# Patient Record
Sex: MALE | Race: WHITE | NOT HISPANIC OR LATINO | Employment: UNEMPLOYED | URBAN - NONMETROPOLITAN AREA
[De-identification: names, ages, dates, MRNs, and addresses within clinical notes are randomized per-mention and may not be internally consistent; named-entity substitution may affect disease eponyms.]

---

## 2023-05-07 ENCOUNTER — HOSPITAL ENCOUNTER (EMERGENCY)
Facility: HOSPITAL | Age: 28
Discharge: HOME | End: 2023-05-07
Attending: EMERGENCY MEDICINE

## 2023-05-07 VITALS
SYSTOLIC BLOOD PRESSURE: 152 MMHG | OXYGEN SATURATION: 96 % | HEART RATE: 65 BPM | RESPIRATION RATE: 20 BRPM | TEMPERATURE: 98.1 F | DIASTOLIC BLOOD PRESSURE: 84 MMHG

## 2023-05-07 DIAGNOSIS — L24.9 IRRITANT DERMATITIS: Primary | ICD-10-CM

## 2023-05-07 RX ORDER — DIAPER,BRIEF,INFANT-TODD,DISP
EACH MISCELLANEOUS ONCE
Status: DISCONTINUED | OUTPATIENT
Start: 2023-05-07 | End: 2023-05-07 | Stop reason: HOSPADM

## 2023-05-07 RX ORDER — DEXAMETHASONE 4 MG/1
10 TABLET ORAL ONCE
Status: COMPLETED | OUTPATIENT
Start: 2023-05-07 | End: 2023-05-07

## 2023-05-07 RX ORDER — DIAPER,BRIEF,INFANT-TODD,DISP
EACH MISCELLANEOUS 2 TIMES DAILY
Qty: 30 G | Refills: 0 | Status: SHIPPED | OUTPATIENT
Start: 2023-05-07 | End: 2023-05-12

## 2023-05-07 RX ADMIN — DEXAMETHASONE 10 MG: 4 TABLET ORAL at 20:40

## 2023-05-08 NOTE — ED PROVIDER NOTES
History  Chief Complaint   Patient presents with   • Rash     Patient states he fell into some bushes and noticed a rash that burns on both of his arms  This is a 29-year-old male who presents to the emergency department because of a rash developing on his right ventral wrist and left lateral forearm after he fell into a pile of brush about 2 hours prior to arrival   He states that the rash developed within seconds of contacting the plant material   It did blister in the area of the right wrist and left forearm, but has markedly decreased since that point  The areas now have a mild burning sensation  He notes that the area from which the brush was drawn does contain poison ivy, but also contains a distinct plant that has small irritant leaves that seem to cause a reaction when they touch the skin  He is not sure exactly what plants may have been in the brush into which he fell  He did wash the areas off vigorously with water but did not apply any other topical agents to the area  At no point did he have any facial swelling/tongue swelling/dyspnea/generalized urticaria  Very minimal reaction present at this point  There was never any evidence of a generalized anaphylactic reaction  A topical steroid medication would make the most sense in this case  Unfortunately, hydrocortisone was not available in the emergency department  Patient was given an enteral dose of dexamethasone which should provide fairly long duration effects and a prescription for hydrocortisone which can use for the next 5 days or so  Would not expect there to be persistent or prolonged rash given that it has already markedly improved within the space of 90 minutes  If it were to be prolonged, he should have the area reexamined  Otherwise all questions were answered to his satisfaction prior to discharge  He expressed understanding and agreed to plan        History provided by:  Significant other, patient and relative  Rash      None       No past medical history on file  No past surgical history on file  No family history on file  I have reviewed and agree with the history as documented  No existing history information found  No existing history information found  Review of Systems   Constitutional: Negative  Musculoskeletal: Negative  Skin: Positive for rash  Negative for color change, pallor and wound  Hematological: Negative  Physical Exam  Physical Exam  Vitals and nursing note reviewed  Constitutional:       General: He is awake  He is not in acute distress  Appearance: Normal appearance  He is well-developed and well-groomed  He is not ill-appearing  HENT:      Head: Normocephalic and atraumatic  Right Ear: Hearing and external ear normal       Left Ear: Hearing and external ear normal    Neck:      Trachea: Trachea and phonation normal    Cardiovascular:      Rate and Rhythm: Normal rate and regular rhythm  Pulses: Normal pulses  Radial pulses are 2+ on the right side and 2+ on the left side  Pulmonary:      Effort: No tachypnea, accessory muscle usage or respiratory distress  Skin:     General: Skin is warm and dry  Capillary Refill: Capillary refill takes less than 2 seconds  Comments: Right ventrolateral wrist: Well demarcated erythematous papular rash approximately 2 cm X 3 cm: There are no vesicles  No urticaria  No open areas  Left lateral forearm: well-demarcated erythematous papular rash approximately 4 cm X 5 cm with no vesicles or urticaria  Neurological:      Mental Status: He is alert and oriented to person, place, and time  GCS: GCS eye subscore is 4  GCS verbal subscore is 5  GCS motor subscore is 6           Vital Signs  ED Triage Vitals [05/07/23 1944]   Temperature Pulse Respirations Blood Pressure SpO2   98 1 °F (36 7 °C) 65 20 152/84 96 %      Temp src Heart Rate Source Patient Position - Orthostatic VS BP Location FiO2 (%)   -- -- -- -- --      Pain Score       --           Vitals:    05/07/23 1944   BP: 152/84   Pulse: 65         Visual Acuity      ED Medications  Medications   hydrocortisone 1 % cream ( Topical Not Given 5/7/23 2039)   dexamethasone (DECADRON) tablet 10 mg (10 mg Oral Given 5/7/23 2040)       Diagnostic Studies  Results Reviewed     None                 No orders to display              Procedures  Procedures         ED Course         MDM    Disposition  Final diagnoses:   Irritant dermatitis of right wrist and left forearm     Time reflects when diagnosis was documented in both MDM as applicable and the Disposition within this note     Time User Action Codes Description Comment    5/7/2023  8:07 PM Erma Arroyo Add [L24 9] Irritant dermatitis     5/7/2023  8:07 PM Anam Jensen 1521 [L24 9] Irritant dermatitis of right wrist and left forearm       ED Disposition     ED Disposition   Discharge    Condition   Stable    Date/Time   Sun May 7, 2023  8:07 PM    Comment   Ronnie Kurtz discharge to home/self care  Follow-up Information    None         Discharge Medication List as of 5/7/2023  8:10 PM      START taking these medications    Details   hydrocortisone 1 % ointment Apply topically 2 (two) times a day for 5 days, Starting Sun 5/7/2023, Until Fri 5/12/2023, Print             No discharge procedures on file      PDMP Review     None          ED Provider  Electronically Signed by           Lavell Persaud DO  05/07/23 2884

## 2023-05-08 NOTE — DISCHARGE INSTRUCTIONS
You can apply the topical steroid twice daily for the next 5 days  Similar topical steroids may be available more cheaply over-the-counter  A pharmacist can assist you with this  If the areas are getting worse despite use of the topical steroid, you should be looked at again

## 2023-10-28 ENCOUNTER — HOSPITAL ENCOUNTER (EMERGENCY)
Facility: HOSPITAL | Age: 28
Discharge: HOME | End: 2023-10-28
Attending: EMERGENCY MEDICINE

## 2023-10-28 VITALS
RESPIRATION RATE: 18 BRPM | WEIGHT: 165 LBS | HEIGHT: 68 IN | BODY MASS INDEX: 25.01 KG/M2 | TEMPERATURE: 97 F | SYSTOLIC BLOOD PRESSURE: 127 MMHG | DIASTOLIC BLOOD PRESSURE: 68 MMHG | HEART RATE: 63 BPM | OXYGEN SATURATION: 96 %

## 2023-10-28 DIAGNOSIS — R11.2 NAUSEA AND VOMITING: Primary | ICD-10-CM

## 2023-10-28 PROCEDURE — 99282 EMERGENCY DEPT VISIT SF MDM: CPT

## 2023-10-28 PROCEDURE — 99283 EMERGENCY DEPT VISIT LOW MDM: CPT | Performed by: EMERGENCY MEDICINE

## 2023-10-28 NOTE — Clinical Note
Amisha Almanzar was seen and treated in our emergency department on 10/28/2023.    ?    ? ? Diagnosis: nause and vomiting    Pratik Montalvo  may return to work on return date. He may return on this date: 10/29/2023    ? If you have any questions or concerns, please don't hesitate to call.       Jovita Walsh, DO    ______________________________           _______________          _______________  Hospital Representative                              Date                                Time

## 2023-10-29 NOTE — ED PROVIDER NOTES
History  Chief Complaint   Patient presents with    Vomiting     Pt vomited earlier today and called off work, He needs a note to return to work     Armand Duverney is a 25y.o. year old male without PCP presenting to the [facility] ED for evaluation after two episodes of vomiting. Patient states he was in normal state of health prior to going to bed last night. Earlier this morning patient noted nausea and had 2 episodes of vomiting. He had no associated fever, congestion, chest pain, dyspnea or abdominal pain. No reported diarrhea. No recent sick contacts or travel. No suspicious food intake. Patient states he had to call off of work due to his symptoms however has been feeling fine throughout the day today. He was told by his employer he needed a note stating he can return to work. He has had resolution of nausea and vomiting. He is tolerating oral intake including drinking a [employer] energy drink shortly prior to arrival this evening. At time evaluation in the emergency department the patient denies abdominal pain, nausea or diarrhea. Patient has not taken/received any medications at home for relief of symptoms. History provided by:  Medical records and patient   used: No    Vomiting  Associated symptoms: no abdominal pain, no arthralgias, no chills, no diarrhea and no fever        Prior to Admission Medications   Prescriptions Last Dose Informant Patient Reported? Taking?   hydrocortisone 1 % ointment   No No   Sig: Apply topically 2 (two) times a day for 5 days      Facility-Administered Medications: None       History reviewed. No pertinent past medical history. History reviewed. No pertinent surgical history. History reviewed. No pertinent family history. I have reviewed and agree with the history as documented.     E-Cigarette/Vaping    E-Cigarette Use Current Every Day User      E-Cigarette/Vaping Substances     Social History     Tobacco Use    Smoking status: Every Day Types: Cigarettes    Smokeless tobacco: Never   Vaping Use    Vaping Use: Every day   Substance Use Topics    Alcohol use: Yes    Drug use: Yes     Types: Marijuana       Review of Systems   Constitutional:  Negative for chills and fever. HENT:  Negative for congestion and rhinorrhea. Respiratory:  Negative for shortness of breath. Cardiovascular:  Negative for chest pain. Gastrointestinal:  Positive for nausea and vomiting. Negative for abdominal pain and diarrhea. Genitourinary:  Negative for dysuria, flank pain, penile pain and testicular pain. Musculoskeletal:  Negative for arthralgias. Skin:  Negative for rash. All other systems reviewed and are negative. Physical Exam  Physical Exam  Vitals and nursing note reviewed. Constitutional:       General: He is not in acute distress. Appearance: He is well-developed. He is not ill-appearing, toxic-appearing or diaphoretic. HENT:      Head: Normocephalic and atraumatic. Nose: No congestion or rhinorrhea. Eyes:      General:         Right eye: No discharge. Left eye: No discharge. Cardiovascular:      Rate and Rhythm: Normal rate and regular rhythm. Pulmonary:      Effort: Pulmonary effort is normal. No respiratory distress. Breath sounds: Normal breath sounds. No wheezing or rales. Abdominal:      Palpations: Abdomen is soft. Tenderness: There is no abdominal tenderness. There is no right CVA tenderness, left CVA tenderness, guarding or rebound. Musculoskeletal:      Cervical back: Normal range of motion. No rigidity. Skin:     General: Skin is warm. Capillary Refill: Capillary refill takes less than 2 seconds. Neurological:      Mental Status: He is alert and oriented to person, place, and time.    Psychiatric:         Mood and Affect: Mood normal.         Behavior: Behavior normal.         Vital Signs  ED Triage Vitals [10/28/23 2257]   Temperature Pulse Respirations Blood Pressure SpO2   (!) 97 °F (36.1 °C) 63 18 127/68 96 %      Temp Source Heart Rate Source Patient Position - Orthostatic VS BP Location FiO2 (%)   Tympanic Monitor Sitting Left arm --      Pain Score       No Pain           Vitals:    10/28/23 2257   BP: 127/68   Pulse: 63   Patient Position - Orthostatic VS: Sitting         Visual Acuity      ED Medications  Medications - No data to display    Diagnostic Studies  Results Reviewed       None                   No orders to display              Procedures  Procedures         ED Course                               SBIRT 20yo+      Flowsheet Row Most Recent Value   Initial Alcohol Screen: US AUDIT-C     1. How often do you have a drink containing alcohol? 0 Filed at: 10/28/2023 2301   2. How many drinks containing alcohol do you have on a typical day you are drinking? 0 Filed at: 10/28/2023 2301   3a. Male UNDER 65: How often do you have five or more drinks on one occasion? 0 Filed at: 10/28/2023 2301   Audit-C Score 0 Filed at: 10/28/2023 2301   HEIDI: How many times in the past year have you. .. Used an illegal drug or used a prescription medication for non-medical reasons? Never Filed at: 10/28/2023 2301                      Medical Decision Making    25 y.o. male presenting for evaluation after two episodes of N/V. Symptoms since resolved, tolerating PO intake including a monster energy drink PTA.  VSS, no abdominal tenderness on exam.  I considered and do not suspect DM, cholecystitis, pancreatitis, appendicitis, bowel obstruction, diverticulitis,  kidney stone or testicular torsion. Symptoms more likely consistent with transient gastritis. I have discussed with the patient our plan to discharge them from the ED and the patient is in agreement with this plan. The patient was provided a written after visit summary with strict RTED precautions. Patient provided note to return to work tomorrow if he remains asymptomatic.     Followup: I have discussed with the patient plan to follow up with a PCP. Contact information provided in AVS.                 Disposition  Final diagnoses:   Nausea and vomiting     Time reflects when diagnosis was documented in both MDM as applicable and the Disposition within this note       Time User Action Codes Description Comment    10/28/2023 11:07 PM Tino Carolina Add [R11.2] Nausea and vomiting           ED Disposition       ED Disposition   Discharge    Condition   Stable    Date/Time   Sat Oct 28, 2023 2300 [address] discharge to home/self care. Follow-up Information       Follow up With Specialties Details Why Contact Info Additional [facility] Schedule an appointment as soon as possible for a visit  To establish care. [address] [place] [phone]  [address] [facility], [address] [place] [address], [address]            Discharge Medication List as of 10/28/2023 11:08 PM        CONTINUE these medications which have NOT CHANGED    Details   hydrocortisone 1 % ointment Apply topically 2 (two) times a day for 5 days, Starting Sun 5/7/2023, Until Fri 5/12/2023, Print             No discharge procedures on file.     PDMP Review       None            ED Provider  Electronically Signed by             Debi Whitman DO  10/28/23 7899

## 2023-10-29 NOTE — DISCHARGE INSTRUCTIONS
You have been seen for nausea and vomiting. Return to the emergency department if you develop worsening vomiting, abdominal pain, fevers or any other symptoms of concern. Please follow up with a PCP by calling the number provided.

## 2024-04-30 ENCOUNTER — HOSPITAL ENCOUNTER (EMERGENCY)
Facility: HOSPITAL | Age: 29
Discharge: HOME | End: 2024-04-30
Attending: EMERGENCY MEDICINE | Admitting: EMERGENCY MEDICINE

## 2024-04-30 VITALS
RESPIRATION RATE: 18 BRPM | WEIGHT: 164.9 LBS | TEMPERATURE: 98.5 F | DIASTOLIC BLOOD PRESSURE: 65 MMHG | OXYGEN SATURATION: 93 % | HEART RATE: 61 BPM | BODY MASS INDEX: 25.07 KG/M2 | SYSTOLIC BLOOD PRESSURE: 113 MMHG

## 2024-04-30 DIAGNOSIS — K62.5 RECTAL BLEEDING: Primary | ICD-10-CM

## 2024-04-30 DIAGNOSIS — E87.6 HYPOKALEMIA: ICD-10-CM

## 2024-04-30 LAB
APTT PPP: 31 SECONDS (ref 23–37)
BASOPHILS # BLD AUTO: 0.03 THOUSANDS/ÂΜL (ref 0–0.1)
BASOPHILS NFR BLD AUTO: 0 % (ref 0–1)
EOSINOPHIL # BLD AUTO: 0.3 THOUSAND/ÂΜL (ref 0–0.61)
EOSINOPHIL NFR BLD AUTO: 4 % (ref 0–6)
ERYTHROCYTE [DISTWIDTH] IN BLOOD BY AUTOMATED COUNT: 12.7 % (ref 11.6–15.1)
HCT VFR BLD AUTO: 32.3 % (ref 36.5–49.3)
HGB BLD-MCNC: 10.3 G/DL (ref 12–17)
IMM GRANULOCYTES # BLD AUTO: 0.01 THOUSAND/UL (ref 0–0.2)
IMM GRANULOCYTES NFR BLD AUTO: 0 % (ref 0–2)
INR PPP: 1.12 (ref 0.84–1.19)
LYMPHOCYTES # BLD AUTO: 2.27 THOUSANDS/ÂΜL (ref 0.6–4.47)
LYMPHOCYTES NFR BLD AUTO: 29 % (ref 14–44)
MAGNESIUM SERPL-MCNC: 2 MG/DL (ref 1.9–2.7)
MCH RBC QN AUTO: 26.5 PG (ref 26.8–34.3)
MCHC RBC AUTO-ENTMCNC: 31.9 G/DL (ref 31.4–37.4)
MCV RBC AUTO: 83 FL (ref 82–98)
MONOCYTES # BLD AUTO: 0.69 THOUSAND/ÂΜL (ref 0.17–1.22)
MONOCYTES NFR BLD AUTO: 9 % (ref 4–12)
NEUTROPHILS # BLD AUTO: 4.43 THOUSANDS/ÂΜL (ref 1.85–7.62)
NEUTS SEG NFR BLD AUTO: 58 % (ref 43–75)
NRBC BLD AUTO-RTO: 0 /100 WBCS
PHOSPHATE SERPL-MCNC: 2.9 MG/DL (ref 2.7–4.5)
PLATELET # BLD AUTO: 249 THOUSANDS/UL (ref 149–390)
PMV BLD AUTO: 10.4 FL (ref 8.9–12.7)
PROTHROMBIN TIME: 14.3 SECONDS (ref 11.6–14.5)
RBC # BLD AUTO: 3.88 MILLION/UL (ref 3.88–5.62)
WBC # BLD AUTO: 7.73 THOUSAND/UL (ref 4.31–10.16)

## 2024-04-30 PROCEDURE — 83735 ASSAY OF MAGNESIUM: CPT | Performed by: EMERGENCY MEDICINE

## 2024-04-30 PROCEDURE — 99284 EMERGENCY DEPT VISIT MOD MDM: CPT | Performed by: EMERGENCY MEDICINE

## 2024-04-30 PROCEDURE — 85610 PROTHROMBIN TIME: CPT | Performed by: EMERGENCY MEDICINE

## 2024-04-30 PROCEDURE — 84100 ASSAY OF PHOSPHORUS: CPT | Performed by: EMERGENCY MEDICINE

## 2024-04-30 PROCEDURE — 85025 COMPLETE CBC W/AUTO DIFF WBC: CPT | Performed by: EMERGENCY MEDICINE

## 2024-04-30 PROCEDURE — 36415 COLL VENOUS BLD VENIPUNCTURE: CPT | Performed by: EMERGENCY MEDICINE

## 2024-04-30 PROCEDURE — 80053 COMPREHEN METABOLIC PANEL: CPT | Performed by: EMERGENCY MEDICINE

## 2024-04-30 PROCEDURE — 85730 THROMBOPLASTIN TIME PARTIAL: CPT | Performed by: EMERGENCY MEDICINE

## 2024-04-30 PROCEDURE — 99284 EMERGENCY DEPT VISIT MOD MDM: CPT

## 2024-04-30 RX ORDER — POTASSIUM CHLORIDE 20MEQ/15ML
40 LIQUID (ML) ORAL ONCE
Status: DISCONTINUED | OUTPATIENT
Start: 2024-04-30 | End: 2024-04-30 | Stop reason: HOSPADM

## 2024-04-30 RX ORDER — MAGNESIUM SULFATE HEPTAHYDRATE 40 MG/ML
2 INJECTION, SOLUTION INTRAVENOUS ONCE
Status: DISCONTINUED | OUTPATIENT
Start: 2024-04-30 | End: 2024-04-30

## 2024-04-30 RX ORDER — LANOLIN ALCOHOL/MO/W.PET/CERES
400 CREAM (GRAM) TOPICAL ONCE
Status: DISCONTINUED | OUTPATIENT
Start: 2024-04-30 | End: 2024-04-30 | Stop reason: HOSPADM

## 2024-04-30 NOTE — DISCHARGE INSTRUCTIONS
Please go to gastroenterology office tomorrow on May 1st at 1130 AM:    [address] [facility]   [id number]   [phone]

## 2024-04-30 NOTE — ED PROVIDER NOTES
History  Chief Complaint   Patient presents with    Rectal Bleeding     Bloating and rectal bleeding with bowel movement for past 3 months     28-year-old male presents for evaluation of rectal bleeding and intermittent bloating of the abdomen.  Patient states symptoms started approximately 3 months ago distally.  No report of a family history of bowel abnormalities.  Patient denies any recent international or local travel.  States does vape and smoke marijuana.  No other obvious novel ingestions.  Patient denies any fever or chills.  No abdominal pain.  He only describes painless bright red blood per rectum.  No hemorrhoids.  No unintentional weight loss or constitutional symptoms.  Patient has been tolerating p.o. intake without difficulty.  Patient is otherwise asymptomatic.  No rectal trauma or penetration per patient.  On exam, he is overall very well-appearing and not in acute distress.          Prior to Admission Medications   Prescriptions Last Dose Informant Patient Reported? Taking?   hydrocortisone 1 % ointment   No No   Sig: Apply topically 2 (two) times a day for 5 days      Facility-Administered Medications: None       History reviewed. No pertinent past medical history.    History reviewed. No pertinent surgical history.    History reviewed. No pertinent family history.  I have reviewed and agree with the history as documented.    E-Cigarette/Vaping    E-Cigarette Use Current Every Day User      E-Cigarette/Vaping Substances     Social History     Tobacco Use    Smoking status: Every Day     Types: Cigarettes    Smokeless tobacco: Never   Vaping Use    Vaping status: Every Day   Substance Use Topics    Alcohol use: Yes    Drug use: Yes     Types: Marijuana       Review of Systems   Constitutional:  Negative for activity change, appetite change, chills, diaphoresis, fatigue and fever.   HENT:  Negative for dental problem, ear pain, sore throat, trouble swallowing and voice change.    Eyes:  Negative  for pain and visual disturbance.   Respiratory:  Negative for cough, chest tightness, shortness of breath and wheezing.    Cardiovascular:  Negative for chest pain, palpitations and leg swelling.   Gastrointestinal:  Positive for abdominal distention and blood in stool. Negative for abdominal pain, anal bleeding, diarrhea, nausea, rectal pain and vomiting.   Endocrine: Negative for polydipsia, polyphagia and polyuria.   Genitourinary:  Negative for difficulty urinating, dysuria, flank pain, frequency, hematuria and urgency.   Musculoskeletal:  Negative for back pain, joint swelling, myalgias, neck pain and neck stiffness.   Skin:  Negative for pallor, rash and wound.   Neurological:  Negative for dizziness, facial asymmetry, speech difficulty, weakness, light-headedness, numbness and headaches.   Hematological:  Negative for adenopathy.   Psychiatric/Behavioral:  Negative for agitation. The patient is not nervous/anxious.    All other systems reviewed and are negative.      Physical Exam  Physical Exam  Vitals and nursing note reviewed.   Constitutional:       General: He is not in acute distress.     Appearance: He is well-developed. He is not ill-appearing, toxic-appearing or diaphoretic.   HENT:      Head: Normocephalic and atraumatic.      Right Ear: External ear normal.      Left Ear: External ear normal.      Nose: Nose normal. No congestion or rhinorrhea.      Mouth/Throat:      Mouth: Mucous membranes are moist.   Eyes:      General: No visual field deficit or scleral icterus.        Right eye: No discharge.         Left eye: No discharge.      Extraocular Movements: Extraocular movements intact.      Conjunctiva/sclera: Conjunctivae normal.      Pupils: Pupils are equal, round, and reactive to light.   Neck:      Thyroid: No thyromegaly.      Vascular: No JVD.      Trachea: No tracheal deviation.   Cardiovascular:      Rate and Rhythm: Normal rate and regular rhythm.      Pulses: Normal pulses.      Heart  sounds: Normal heart sounds, S1 normal and S2 normal. No murmur heard.     No friction rub. No gallop.   Pulmonary:      Effort: Pulmonary effort is normal. No accessory muscle usage, respiratory distress or retractions.      Breath sounds: Normal breath sounds and air entry. No stridor or decreased air movement. No decreased breath sounds, wheezing, rhonchi or rales.   Chest:      Chest wall: No tenderness.   Abdominal:      General: Bowel sounds are normal. There is no distension.      Palpations: Abdomen is soft. There is no mass.      Tenderness: There is no abdominal tenderness. There is no right CVA tenderness, left CVA tenderness, guarding or rebound. Negative signs include De La Cruz's sign and McBurney's sign.      Hernia: No hernia is present.   Genitourinary:     Prostate: Normal. Not enlarged, not tender and no nodules present.      Rectum: Guaiac result positive. No mass, tenderness, anal fissure, external hemorrhoid or internal hemorrhoid. Normal anal tone.      Comments: Chaperone present for the exam.  No active bleeding.  No melena or hematochezia.  Musculoskeletal:         General: No swelling, tenderness or deformity. Normal range of motion.      Cervical back: Normal range of motion and neck supple.      Right lower leg: No edema.      Left lower leg: No edema.   Lymphadenopathy:      Cervical: No cervical adenopathy.   Skin:     General: Skin is warm and dry.      Capillary Refill: Capillary refill takes less than 2 seconds.      Coloration: Skin is not pale.      Findings: No erythema or rash.   Neurological:      General: No focal deficit present.      Mental Status: He is alert and oriented to person, place, and time.      GCS: GCS eye subscore is 4. GCS verbal subscore is 5. GCS motor subscore is 6.      Cranial Nerves: Cranial nerves 2-12 are intact. No cranial nerve deficit, dysarthria or facial asymmetry.      Sensory: Sensation is intact. No sensory deficit.      Motor: Motor function is  intact. No weakness, tremor, atrophy, abnormal muscle tone or seizure activity.      Coordination: Coordination is intact. Coordination normal.      Gait: Gait is intact.      Deep Tendon Reflexes: Reflexes are normal and symmetric. Reflexes normal.   Psychiatric:         Behavior: Behavior normal.           Vital Signs  ED Triage Vitals [04/30/24 1438]   Temperature Pulse Respirations Blood Pressure SpO2   98.5 °F (36.9 °C) 62 18 123/68 94 %      Temp Source Heart Rate Source Patient Position - Orthostatic VS BP Location FiO2 (%)   Temporal Monitor Sitting Left arm --      Pain Score       No Pain           Vitals:    04/30/24 1438 04/30/24 1625 04/30/24 1629   BP: 123/68  113/65   Pulse: 62 59 61   Patient Position - Orthostatic VS: Sitting           Visual Acuity      ED Medications  Medications - No data to display      Diagnostic Studies  Results Reviewed       Procedure Component Value Units Date/Time    Comprehensive metabolic panel [[id number]]  (Abnormal) Collected: 04/30/24 1559    Lab Status: Final result Specimen: Blood from Arm, Right Updated: 04/30/24 1624     Sodium 137 mmol/L      Potassium 3.4 mmol/L      Chloride 103 mmol/L      CO2 28 mmol/L      ANION GAP 6 mmol/L      BUN 8 mg/dL      Creatinine 0.78 mg/dL      Glucose 87 mg/dL      Calcium 9.5 mg/dL      AST 16 U/L      ALT 10 U/L      Alkaline Phosphatase 32 U/L      Total Protein 6.7 g/dL      Albumin 4.3 g/dL      Total Bilirubin 0.53 mg/dL      eGFR 121 ml/min/1.73sq m     Narrative:      National Kid[employer] Foundation guidelines for Chronic Kidney Disease (CKD):     Stage 1 with normal or high GFR (GFR > 90 mL/min/1.73 square meters)    Stage 2 Mild CKD (GFR = 60-89 mL/min/1.73 square meters)    Stage 3A Moderate CKD (GFR = 45-59 mL/min/1.73 square meters)    Stage 3B Moderate CKD (GFR = 30-44 mL/min/1.73 square meters)    Stage 4 Severe CKD (GFR = 15-29 mL/min/1.73 square meters)    Stage 5 End Stage CKD (GFR <15 mL/min/1.73 square  meters)  Note: GFR calculation is accurate only with a steady state creatinine    Phosphorus [[id number]]  (Normal) Collected: 04/30/24 1559    Lab Status: Final result Specimen: Blood from Arm, Right Updated: 04/30/24 1624     Phosphorus 2.9 mg/dL     Magnesium [[id number]]  (Normal) Collected: 04/30/24 1559    Lab Status: Final result Specimen: Blood from Arm, Right Updated: 04/30/24 1624     Magnesium 2.0 mg/dL     Protime-INR [[id number]]  (Normal) Collected: 04/30/24 1559    Lab Status: Final result Specimen: Blood from Arm, Right Updated: 04/30/24 1619     Protime 14.3 seconds      INR 1.12    APTT [[id number]]  (Normal) Collected: 04/30/24 1559    Lab Status: Final result Specimen: Blood from Arm, Right Updated: 04/30/24 1619     PTT 31 seconds     CBC and differential [[id number]]  (Abnormal) Collected: 04/30/24 1559    Lab Status: Final result Specimen: Blood from Arm, Right Updated: 04/30/24 1608     WBC 7.73 Thousand/uL      RBC 3.88 Million/uL      Hemoglobin 10.3 g/dL      Hematocrit 32.3 %      MCV 83 fL      MCH 26.5 pg      MCHC 31.9 g/dL      RDW 12.7 %      MPV 10.4 fL      Platelets 249 Thousands/uL      nRBC 0 /100 WBCs      Segmented % 58 %      Immature Grans % 0 %      Lymphocytes % 29 %      Monocytes % 9 %      Eosinophils Relative 4 %      Basophils Relative 0 %      Absolute Neutrophils 4.43 Thousands/µL      Absolute Immature Grans 0.01 Thousand/uL      Absolute Lymphocytes 2.27 Thousands/µL      Absolute Monocytes 0.69 Thousand/µL      Eosinophils Absolute 0.30 Thousand/µL      Basophils Absolute 0.03 Thousands/µL                    No orders to display              Procedures  Procedures         ED Course           1625 hrs.: Discussed case with gastroenterology on-call.  Patient will have an outpatient appointment tomorrow at 1130 hrs.  He is to present himself to the call dental office.  Patient is aware.  Will provide electrolyte replacement.                    SBIRT 20yo+      Flowsheet  Row Most Recent Value   Initial Alcohol Screen: US AUDIT-C     1. How often do you have a drink containing alcohol? 0 Filed at: 04/30/2024 1440   2. How many drinks containing alcohol do you have on a typical day you are drinking?  0 Filed at: 04/30/2024 1440   3a. Male UNDER 65: How often do you have five or more drinks on one occasion? 0 Filed at: 04/30/2024 1440   Audit-C Score 0 Filed at: 04/30/2024 1440   HEIDI: How many times in the past year have you...    Used an illegal drug or used a prescription medication for non-medical reasons? Never Filed at: 04/30/2024 1440                      Medical Decision Making  28-year-old male presents for evaluation of rectal bleeding and intermittent bloating of the abdomen.  Patient states symptoms started approximately 3 months ago distally.  No report of a family history of bowel abnormalities.  Patient denies any recent international or local travel.  States does vape and smoke marijuana.  No other obvious novel ingestions.  Patient denies any fever or chills.  No abdominal pain.  He only describes painless bright red blood per rectum.  No hemorrhoids.  No unintentional weight loss or constitutional symptoms.  Patient has been tolerating p.o. intake without difficulty.  Patient is otherwise asymptomatic.  No rectal trauma or penetration per patient.  On exam, he is overall very well-appearing and not in acute distress.    Check basic labs, provide symptom management, imaging as appropriate, and disposition as appropriate.  Will need gastroenterology follow-up.  At this time based on lack of abdominal pain or tenderness on physical exam and no active bleeding, will not obtain CT imaging.  Patient is agreeable with this plan.      Amount and/or Complexity of Data Reviewed  Labs: ordered. Decision-making details documented in ED Course.             Disposition  Final diagnoses:   Rectal bleeding   Hypokalemia     Time reflects when diagnosis was documented in both MDM as  applicable and the Disposition within this note       Time User Action Codes Description Comment    4/30/2024  3:40 PM David Kirkpatrick [K62.5] Rectal bleeding     4/30/2024  4:28 PM David Kirkpatrick [E87.6] Hypokalemia           ED Disposition       ED Disposition   Discharge    Condition   Stable    Date/Time   Tue Apr 30, 2024 1540    Comment   Danny Nova discharge to home/self care.                   Follow-up Information       Follow up With Specialties Details Why Contact Info Additional Information    [facility]  TOMORROW at [address] [facility] [address]  [phone] [facility] [place], [address] [place], [place], [address]   [phone]    [facility]   [address] [address]  [phone] [facility] [phone]            Discharge Medication List as of 4/30/2024  4:35 PM        CONTINUE these medications which have NOT CHANGED    Details   hydrocortisone 1 % ointment Apply topically 2 (two) times a day for 5 days, Starting Sun 5/7/2023, Until Fri 5/12/2023, Print                 PDMP Review       None            ED Provider  Electronically Signed by             David Kirkpatrick DO  05/01/24 7993

## 2024-04-30 NOTE — Clinical Note
Danny Nova was seen and treated in our emergency department on 4/30/2024.                Diagnosis:     Danny  may return to work on return date.    He may return on this date: 05/02/2024         If you have any questions or concerns, please don't hesitate to call.      David Kirkpatrick, DO    ______________________________           _______________          _______________  Hospital Representative                              Date                                Time

## 2024-05-01 ENCOUNTER — TELEPHONE (OUTPATIENT)
Dept: GASTROENTEROLOGY | Facility: CLINIC | Age: 29
End: 2024-05-01

## 2024-05-01 NOTE — TELEPHONE ENCOUNTER
The patient did arrive for his office visit today, however, unfortunately he does not have any medical insurance and is hoping to have some in 2 weeks, and cannot afford to pay cash or out-of-pocket.  We are going to reschedule him for a follow-up visit in 2 weeks with the hopes that he will have some kind of insurance coverage by then and he was also given information for billing department to reach out to see what other possible programs he may qualify for with regards to some kind of assistance in getting some kind of medical coverage.    I did quickly talk to the patient just so I could get some kind of a sense of what his symptoms and how he is feeling and what he should do if things should change or worsen in the future.    The patient appeared to be without any kind of signs or symptoms of distress, SOB, with normal skin tone and mental comprehension skills.  The patient reports that approximately 3 weeks ago he did have some issues with constipation, as he does when he has too much milk products.  He reports that he did have what sounds like a stool bolus that required him to digitally disimpact himself and it appears that around then is when the bleeding started.  I did discuss with the patient that it is quite possible that with the digital disimpaction and the size of the stool bolus he may have caused some type of laceration or injury inside his rectum or anus as this tissue is very sensitive and can tear easily and thus the cause of his bleeding, especially if he continues to deal with constipation as he may be lactose intolerant.    I quickly advised the patient that he should make sure he is drinking enough water to make his bowel movements softer to prevent any further damage and let everything heal as well as a try to increase some daily dietary fiber such as fruits and vegetables or consider a fiber supplementation such as Metamucil or Benefiber daily as well.    We also briefly discussed that if  his bleeding symptoms should worsen, persist, or he should start with symptoms of shortness of breath, chest pain, dizziness, or significant fatigue, he should go back to the ER for reevaluation.  The patient was agreeable and verbalized an understanding.

## 2024-05-08 LAB
ALBUMIN SERPL BCP-MCNC: 4.3 G/DL (ref 3.5–5)
ALP SERPL-CCNC: 32 U/L (ref 34–104)
ALT SERPL W P-5'-P-CCNC: 10 U/L (ref 7–52)
ANION GAP SERPL CALCULATED.3IONS-SCNC: 6 MMOL/L (ref 4–13)
AST SERPL W P-5'-P-CCNC: 16 U/L (ref 13–39)
BILIRUB SERPL-MCNC: 0.53 MG/DL (ref 0.2–1)
BUN SERPL-MCNC: 8 MG/DL (ref 5–25)
CALCIUM SERPL-MCNC: 9.5 MG/DL (ref 8.4–10.2)
CHLORIDE SERPL-SCNC: 103 MMOL/L (ref 96–108)
CO2 SERPL-SCNC: 28 MMOL/L (ref 21–32)
CREAT SERPL-MCNC: 0.78 MG/DL (ref 0.6–1.3)
GFR SERPL CREATININE-BSD FRML MDRD: 121 ML/MIN/1.73SQ M
GLUCOSE SERPL-MCNC: 87 MG/DL (ref 65–140)
POTASSIUM SERPL-SCNC: 3.4 MMOL/L (ref 3.5–5.3)
PROT SERPL-MCNC: 6.7 G/DL (ref 6.4–8.4)
SODIUM SERPL-SCNC: 137 MMOL/L (ref 135–147)

## 2024-06-20 ENCOUNTER — TELEPHONE (OUTPATIENT)
Dept: GASTROENTEROLOGY | Facility: CLINIC | Age: 29
End: 2024-06-20

## 2025-05-22 ENCOUNTER — HOSPITAL ENCOUNTER (INPATIENT)
Facility: HOSPITAL | Age: 30
LOS: 1 days | Discharge: HOME | DRG: 663 | End: 2025-05-23
Attending: EMERGENCY MEDICINE | Admitting: FAMILY MEDICINE
Payer: COMMERCIAL

## 2025-05-22 ENCOUNTER — APPOINTMENT (EMERGENCY)
Dept: CT IMAGING | Facility: HOSPITAL | Age: 30
DRG: 663 | End: 2025-05-22
Payer: COMMERCIAL

## 2025-05-22 DIAGNOSIS — K59.00 CONSTIPATION, UNSPECIFIED CONSTIPATION TYPE: ICD-10-CM

## 2025-05-22 DIAGNOSIS — Z87.19 HISTORY OF RECTAL BLEEDING: ICD-10-CM

## 2025-05-22 DIAGNOSIS — R10.10 PAIN OF UPPER ABDOMEN: ICD-10-CM

## 2025-05-22 DIAGNOSIS — D64.9 SYMPTOMATIC ANEMIA: Primary | ICD-10-CM

## 2025-05-22 DIAGNOSIS — Z13.9 ENCOUNTER FOR SCREENING INVOLVING SOCIAL DETERMINANTS OF HEALTH (SDOH): ICD-10-CM

## 2025-05-22 PROBLEM — K59.09 CHRONIC CONSTIPATION: Status: ACTIVE | Noted: 2025-05-22

## 2025-05-22 LAB
ABO GROUP BLD: NORMAL
ABO GROUP BLD: NORMAL
ALBUMIN SERPL BCG-MCNC: 4.7 G/DL (ref 3.5–5)
ALP SERPL-CCNC: 31 U/L (ref 34–104)
ALT SERPL W P-5'-P-CCNC: 8 U/L (ref 7–52)
ANION GAP SERPL CALCULATED.3IONS-SCNC: 10 MMOL/L (ref 4–13)
ANISOCYTOSIS BLD QL SMEAR: PRESENT
APTT PPP: 27 SECONDS (ref 23–34)
AST SERPL W P-5'-P-CCNC: 15 U/L (ref 13–39)
BASOPHILS # BLD AUTO: 0.01 THOUSANDS/ÂΜL (ref 0–0.1)
BASOPHILS NFR BLD AUTO: 0 % (ref 0–1)
BILIRUB SERPL-MCNC: 0.66 MG/DL (ref 0.2–1)
BLD GP AB SCN SERPL QL: NEGATIVE
BUN SERPL-MCNC: 11 MG/DL (ref 5–25)
CALCIUM SERPL-MCNC: 9.6 MG/DL (ref 8.4–10.2)
CHLORIDE SERPL-SCNC: 104 MMOL/L (ref 96–108)
CO2 SERPL-SCNC: 23 MMOL/L (ref 21–32)
CREAT SERPL-MCNC: 0.67 MG/DL (ref 0.6–1.3)
EOSINOPHIL # BLD AUTO: 0.05 THOUSAND/ÂΜL (ref 0–0.61)
EOSINOPHIL NFR BLD AUTO: 1 % (ref 0–6)
ERYTHROCYTE [DISTWIDTH] IN BLOOD BY AUTOMATED COUNT: 22.7 % (ref 11.6–15.1)
FERRITIN SERPL-MCNC: 2 NG/ML (ref 30–336)
FOLATE SERPL-MCNC: 14.2 NG/ML
GFR SERPL CREATININE-BSD FRML MDRD: 128 ML/MIN/1.73SQ M
GLUCOSE SERPL-MCNC: 110 MG/DL (ref 65–140)
HCT VFR BLD AUTO: 19.6 % (ref 36.5–49.3)
HCT VFR BLD AUTO: 24.3 % (ref 36.5–49.3)
HGB BLD-MCNC: 4.4 G/DL (ref 12–17)
HGB BLD-MCNC: 6.4 G/DL (ref 12–17)
HYPERCHROMIA BLD QL SMEAR: PRESENT
IMM GRANULOCYTES # BLD AUTO: 0.02 THOUSAND/UL (ref 0–0.2)
IMM GRANULOCYTES NFR BLD AUTO: 0 % (ref 0–2)
INR PPP: 1.07 (ref 0.85–1.19)
IRON SERPL-MCNC: <10 UG/DL (ref 50–212)
LACTATE SERPL-SCNC: 1.6 MMOL/L (ref 0.5–2)
LIPASE SERPL-CCNC: 28 U/L (ref 11–82)
LYMPHOCYTES # BLD AUTO: 0.98 THOUSANDS/ÂΜL (ref 0.6–4.47)
LYMPHOCYTES # BLD AUTO: 1.24 THOUSAND/UL (ref 0.6–4.47)
LYMPHOCYTES # BLD AUTO: 27 %
LYMPHOCYTES NFR BLD AUTO: 21 % (ref 14–44)
MACROCYTES BLD QL AUTO: PRESENT
MCH RBC QN AUTO: 13.8 PG (ref 26.8–34.3)
MCHC RBC AUTO-ENTMCNC: 22.4 G/DL (ref 31.4–37.4)
MCV RBC AUTO: 61 FL (ref 82–98)
MICROCYTES BLD QL AUTO: PRESENT
MONOCYTES # BLD AUTO: 0.69 THOUSAND/UL (ref 0–1.22)
MONOCYTES # BLD AUTO: 0.73 THOUSAND/ÂΜL (ref 0.17–1.22)
MONOCYTES NFR BLD AUTO: 15 % (ref 4–12)
MONOCYTES NFR BLD AUTO: 16 % (ref 4–12)
NEUTROPHILS # BLD AUTO: 2.82 THOUSANDS/ÂΜL (ref 1.85–7.62)
NEUTS SEG # BLD: 2.67 THOUSAND/UL (ref 1.81–6.82)
NEUTS SEG NFR BLD AUTO: 58 %
NEUTS SEG NFR BLD AUTO: 62 % (ref 43–75)
NRBC BLD AUTO-RTO: 0 /100 WBCS
OVALOCYTES BLD QL SMEAR: PRESENT
PLATELET # BLD AUTO: 214 THOUSANDS/UL (ref 149–390)
PLATELET BLD QL SMEAR: ADEQUATE
PMV BLD AUTO: 9 FL (ref 8.9–12.7)
POIKILOCYTOSIS BLD QL SMEAR: PRESENT
POTASSIUM SERPL-SCNC: 3.5 MMOL/L (ref 3.5–5.3)
PROT SERPL-MCNC: 7.3 G/DL (ref 6.4–8.4)
PROTHROMBIN TIME: 14.3 SECONDS (ref 12.3–15)
RBC # BLD AUTO: 3.2 MILLION/UL (ref 3.88–5.62)
RBC MORPH BLD: PRESENT
RH BLD: POSITIVE
RH BLD: POSITIVE
SODIUM SERPL-SCNC: 137 MMOL/L (ref 135–147)
SPECIMEN EXPIRATION DATE: NORMAL
TIBC SERPL-MCNC: 490 UG/DL (ref 250–450)
TOTAL CELLS COUNTED SPEC: 100
TRANSFERRIN SERPL-MCNC: 350 MG/DL (ref 203–362)
VIT B12 SERPL-MCNC: 332 PG/ML (ref 180–914)
WBC # BLD AUTO: 4.61 THOUSAND/UL (ref 4.31–10.16)

## 2025-05-22 PROCEDURE — 86900 BLOOD TYPING SEROLOGIC ABO: CPT | Performed by: EMERGENCY MEDICINE

## 2025-05-22 PROCEDURE — 85014 HEMATOCRIT: CPT | Performed by: FAMILY MEDICINE

## 2025-05-22 PROCEDURE — 30233N1 TRANSFUSION OF NONAUTOLOGOUS RED BLOOD CELLS INTO PERIPHERAL VEIN, PERCUTANEOUS APPROACH: ICD-10-PCS | Performed by: EMERGENCY MEDICINE

## 2025-05-22 PROCEDURE — 85730 THROMBOPLASTIN TIME PARTIAL: CPT | Performed by: EMERGENCY MEDICINE

## 2025-05-22 PROCEDURE — 80053 COMPREHEN METABOLIC PANEL: CPT

## 2025-05-22 PROCEDURE — 83540 ASSAY OF IRON: CPT

## 2025-05-22 PROCEDURE — 96374 THER/PROPH/DIAG INJ IV PUSH: CPT

## 2025-05-22 PROCEDURE — 99284 EMERGENCY DEPT VISIT MOD MDM: CPT

## 2025-05-22 PROCEDURE — 83690 ASSAY OF LIPASE: CPT

## 2025-05-22 PROCEDURE — 36430 TRANSFUSION BLD/BLD COMPNT: CPT

## 2025-05-22 PROCEDURE — 85610 PROTHROMBIN TIME: CPT | Performed by: EMERGENCY MEDICINE

## 2025-05-22 PROCEDURE — 74178 CT ABD&PLV WO CNTR FLWD CNTR: CPT

## 2025-05-22 PROCEDURE — 85018 HEMOGLOBIN: CPT | Performed by: FAMILY MEDICINE

## 2025-05-22 PROCEDURE — 85007 BL SMEAR W/DIFF WBC COUNT: CPT | Performed by: FAMILY MEDICINE

## 2025-05-22 PROCEDURE — 99223 1ST HOSP IP/OBS HIGH 75: CPT | Performed by: FAMILY MEDICINE

## 2025-05-22 PROCEDURE — 82607 VITAMIN B-12: CPT

## 2025-05-22 PROCEDURE — 99285 EMERGENCY DEPT VISIT HI MDM: CPT | Performed by: EMERGENCY MEDICINE

## 2025-05-22 PROCEDURE — 82728 ASSAY OF FERRITIN: CPT

## 2025-05-22 PROCEDURE — 83605 ASSAY OF LACTIC ACID: CPT

## 2025-05-22 PROCEDURE — 82746 ASSAY OF FOLIC ACID SERUM: CPT

## 2025-05-22 PROCEDURE — 86901 BLOOD TYPING SEROLOGIC RH(D): CPT | Performed by: EMERGENCY MEDICINE

## 2025-05-22 PROCEDURE — 36415 COLL VENOUS BLD VENIPUNCTURE: CPT

## 2025-05-22 PROCEDURE — 83550 IRON BINDING TEST: CPT

## 2025-05-22 PROCEDURE — P9016 RBC LEUKOCYTES REDUCED: HCPCS

## 2025-05-22 PROCEDURE — 86850 RBC ANTIBODY SCREEN: CPT | Performed by: EMERGENCY MEDICINE

## 2025-05-22 PROCEDURE — 85025 COMPLETE CBC W/AUTO DIFF WBC: CPT

## 2025-05-22 PROCEDURE — 86923 COMPATIBILITY TEST ELECTRIC: CPT

## 2025-05-22 RX ORDER — PANTOPRAZOLE SODIUM 40 MG/10ML
40 INJECTION, POWDER, LYOPHILIZED, FOR SOLUTION INTRAVENOUS EVERY 12 HOURS SCHEDULED
Status: DISCONTINUED | OUTPATIENT
Start: 2025-05-22 | End: 2025-05-23 | Stop reason: HOSPADM

## 2025-05-22 RX ORDER — PANTOPRAZOLE SODIUM 40 MG/10ML
40 INJECTION, POWDER, LYOPHILIZED, FOR SOLUTION INTRAVENOUS ONCE
Status: COMPLETED | OUTPATIENT
Start: 2025-05-22 | End: 2025-05-22

## 2025-05-22 RX ADMIN — PANTOPRAZOLE SODIUM 40 MG: 40 INJECTION, POWDER, FOR SOLUTION INTRAVENOUS at 20:31

## 2025-05-22 RX ADMIN — Medication 6 MG: at 21:29

## 2025-05-22 RX ADMIN — IOHEXOL 100 ML: 350 INJECTION, SOLUTION INTRAVENOUS at 14:16

## 2025-05-22 RX ADMIN — PANTOPRAZOLE SODIUM 40 MG: 40 INJECTION, POWDER, FOR SOLUTION INTRAVENOUS at 14:06

## 2025-05-22 NOTE — LETTER
[facility]  [address] [facility]  [place] [address]  Dept: [phone]    May 23, 2025     Patient: Danny Nova   YOB: 1995   Date of Visit: 5/22/2025       To Whom it May Concern:    Danny Nova is under my professional care. He was seen in the hospital from 5/22/2025 to 05/23/25. He may return to work on 5/25 without limitations.    If you have any questions or concerns, please don't hesitate to call.         Sincerely,          Yasmine Durham PA-C

## 2025-05-22 NOTE — ED PROVIDER NOTES
Time reflects when diagnosis was documented in both MDM as applicable and the Disposition within this note       Time User Action Codes Description Comment    5/22/2025  4:13 PM Curt Gracia Add [D64.9] Symptomatic anemia     5/22/2025  4:13 PM Curt Gracia Add [R10.10] Pain of upper abdomen     5/22/2025  4:13 PM Curt Gracia Add [K59.00] Constipation, unspecified constipation type     5/22/2025  4:13 PM Curt Gracia Add [Z87.19] History of rectal bleeding     5/22/2025  6:14 PM Shelia Greer Add [Z13.9] Encounter for screening involving social determinants of health (SDoH)           ED Disposition       ED Disposition   Admit    Condition   Stable    Date/Time   Thu May 22, 2025  4:34 PM    Comment   Case was discussed with Dr. Gupta and the patient's admission status was agreed to be Admission Status: inpatient status to the service of Dr. Gupta.               Assessment & Plan       Medical Decision Making  Patient was seen and evaluated for his presentation as outlined.  Patient at risk for gastrointestinal bleeding, severe anemia, intra-abdominal mass, intra-abdominal infection, electrolyte imbalance, organ failure, other.  Testing as shown.  Found to be severely anemic on labs ordered from triage.  No clinical evidence of active bleeding.  CT of the abdomen pelvis for high-volume bleeding negative.  No other acute findings noted.  Based on patient's reported history as described, suspect either prior rectal bleeding that has resolved but anemia has not yet improved, versus slow gastrointestinal bleeding.  Transfusion ordered in the ED, 3 units ordered, initiated.  Protonix given.  Patient remained hemodynamically stable.  Hospitalist consulted for admission, case discussed with Dr. Gupta, who accepted the patient to the hospitalist service.    Amount and/or Complexity of Data Reviewed  Labs: ordered.  Radiology: ordered.    Risk  Prescription drug management.  Decision regarding  "hospitalization.        ED Course as of 05/22/25 2234   Thu May 22, 2025   1609 Patient updated on CT results, plan for admission due to the severity of his anemia.  Despite asking earlier about gastrointestinal bleeding, patient did say that he was having some rectal bleeding up until just over a month ago when he stopped taking caffeine due to advisement by his doctor.  He has also been trying to make other dietary changes.       Medications   pantoprazole (PROTONIX) injection 40 mg (has no administration in time range)   pantoprazole (PROTONIX) injection 40 mg (40 mg Intravenous Given 5/22/25 1406)   iohexol (OMNIPAQUE) 350 MG/ML injection (MULTI-DOSE) 100 mL (100 mL Intravenous Given 5/22/25 1416)       ED Risk Strat Scores                    No data recorded        SBIRT 20yo+      Flowsheet Row Most Recent Value   Initial Alcohol Screen: US AUDIT-C     1. How often do you have a drink containing alcohol? 0 Filed at: 05/22/2025 1519   2. How many drinks containing alcohol do you have on a typical day you are drinking?  0 Filed at: 05/22/2025 1519   3a. Male UNDER 65: How often do you have five or more drinks on one occasion? 0 Filed at: 05/22/2025 1519   Audit-C Score 0 Filed at: 05/22/2025 1519   HEIDI: How many times in the past year have you...    Used an illegal drug or used a prescription medication for non-medical reasons? Never Filed at: 05/22/2025 1519                            History of Present Illness       Chief Complaint   Patient presents with    Abdominal Pain     Patient reporting seen at gastro yesterday for constipation issues from March. Patient started today with abdominal pain that \"feels like a lava hand\" in his stomach. Denies N/V. Had episode of diarrhea.       Past Medical History[1]   Past Surgical History[2]   Family History[3]   Social History[4]   E-Cigarette/Vaping    E-Cigarette Use Current Every Day User       E-Cigarette/Vaping Substances    Nicotine Yes     Flavoring Yes     "   I have reviewed and agree with the history as documented.     Patient presents to the emergency department for evaluation of abdominal pain, constipation.  Patient states that he has been having constipation issues for the past few months, worse over the past few days, has not had a bowel movement in 4 days except for small liquid bowel movement this morning that was yellow in color.  Reports having severe abdominal pain with attempts to have a bowel movement, described as lava in his stomach.  Described as an intense cramping.  Pain typically last about 10 minutes and self resolves.  Saw gastroenterology yesterday, no specific plan established.  States his hemoglobin was down to 10 a few months ago.  Unclear on what has been done to workup his anemia.  Patient also reports exertional lightheadedness and dyspnea over the past few months.  Denies any chest pain or shortness of breath.  No prior blood transfusions.  No known cancer history.  No fevers or chills.  No other complaints, modifying factors, or associated symptoms.        Review of Systems   All other systems reviewed and are negative.          Objective       ED Triage Vitals   Temperature Pulse Blood Pressure Respirations SpO2 Patient Position - Orthostatic VS   05/22/25 1311 05/22/25 1311 05/22/25 1311 05/22/25 1311 05/22/25 1311 05/22/25 1311   98.1 °F (36.7 °C) (!) 108 142/74 17 100 % Sitting      Temp Source Heart Rate Source BP Location FiO2 (%) Pain Score    05/22/25 1311 05/22/25 1311 05/22/25 1415 -- 05/22/25 1311    Temporal Monitor Right arm  4      Vitals      Date and Time Temp Pulse SpO2 Resp BP Pain Score FACES Pain Rating User   05/22/25 2050 98 °F (36.7 °C) 73 100 % 18 118/72 -- --    05/22/25 2020 98.1 °F (36.7 °C) 64 -- 18 129/71 -- --    05/22/25 2016 -- -- 100 % -- -- No Pain --    05/22/25 1839 98.1 °F (36.7 °C) 68 100 % 16 124/56 -- --    05/22/25 1839 -- -- -- -- -- No Pain --    05/22/25 1815 98.1 °F (36.7 °C) 65 100 %  16 123/70 -- -- MW   05/22/25 1800 -- -- -- -- -- No Pain -- MW   05/22/25 1745 97.9 °F (36.6 °C) 69 100 % 16 125/65 -- -- MW   05/22/25 1733 98.1 °F (36.7 °C) 69 100 % 16 114/66 -- -- MW   05/22/25 1630 -- 75 100 % 16 117/56 -- -- SL   05/22/25 1600 98.3 °F (36.8 °C) 79 100 % 16 125/58 -- -- BF   05/22/25 1545 -- 75 100 % 16 126/58 -- -- BF   05/22/25 1533 98.4 °F (36.9 °C) 77 99 % 16 127/59 -- -- SL   05/22/25 1530 -- 74 100 % 16 118/58 -- -- BF   05/22/25 1518 98.1 °F (36.7 °C) 75 100 % 14 114/58 -- -- SL   05/22/25 1513 98.3 °F (36.8 °C) 76 100 % 16 115/57 -- -- SL   05/22/25 1508 98.2 °F (36.8 °C) 81 100 % 16 115/55 -- -- SL   05/22/25 1453 98.1 °F (36.7 °C) 73 -- 14 128/57 -- -- SL   05/22/25 1445 98.4 °F (36.9 °C) 77 100 % 14 117/59 -- -- SL   05/22/25 1415 -- 76 100 % 12 127/67 -- -- SL   05/22/25 1311 98.1 °F (36.7 °C) 108 100 % 17 142/74 4 -- AS            Physical Exam  Vitals and nursing note reviewed.   Constitutional:       General: He is not in acute distress.     Appearance: He is well-developed. He is not ill-appearing.   HENT:      Head: Normocephalic and atraumatic.     Eyes:      Conjunctiva/sclera: Conjunctivae normal.       Cardiovascular:      Rate and Rhythm: Normal rate and regular rhythm.      Heart sounds: Normal heart sounds. No murmur heard.     No friction rub. No gallop.   Pulmonary:      Effort: Pulmonary effort is normal. No respiratory distress.      Breath sounds: Normal breath sounds. No wheezing, rhonchi or rales.   Abdominal:      General: Abdomen is flat. There is no distension.      Palpations: Abdomen is soft.      Tenderness: There is no abdominal tenderness. There is no guarding or rebound.     Musculoskeletal:         General: No swelling.      Cervical back: Neck supple.     Skin:     General: Skin is warm and dry.      Capillary Refill: Capillary refill takes less than 2 seconds.      Coloration: Skin is pale. Skin is not cyanotic.      Findings: No rash.  "    Neurological:      General: No focal deficit present.      Mental Status: He is alert and oriented to person, place, and time.     Psychiatric:         Mood and Affect: Mood normal.         Behavior: Behavior normal.         Results Reviewed       Procedure Component Value Units Date/Time    Vitamin B12 [[id number]]  (Normal) Collected: 05/22/25 1320    Lab Status: Final result Specimen: Blood from Arm, Left Updated: 05/22/25 2155     Vitamin B-12 332 pg/mL     Folate [[id number]]  (Normal) Collected: 05/22/25 1320    Lab Status: Final result Specimen: Blood from Arm, Left Updated: 05/22/25 2155     Folate 14.2 ng/mL     Ferritin [[id number]]  (Abnormal) Collected: 05/22/25 1320    Lab Status: Final result Specimen: Blood from Arm, Left Updated: 05/22/25 2155     Ferritin 2 ng/mL     Narrative:      \"Guideline based recommendations vary for ferritin cutoff values in defining iron deficiency; additionally, cutoff levels for ferritin may differ depending on the condition. For example, a higher minimum ferritin is typically preferred in adults with restless leg syndrome, where a target of > 75 ng/ml is sought. Ultimately, clinical correlation is needed in determining actionable minimum ferritin level.\"    TIBC Panel (incl. Iron, TIBC, % Iron Saturation) [[id number]]  (Abnormal) Collected: 05/22/25 1320    Lab Status: Final result Specimen: Blood from Arm, Left Updated: 05/22/25 2149     Iron Saturation --     TIBC 490 ug/dL      Iron <10 ug/dL      Transferrin 350 mg/dL      UIBC --    Protime-INR [[id number]]  (Normal) Collected: 05/22/25 1406    Lab Status: Final result Specimen: Blood from Arm, Right Updated: 05/22/25 1425     Protime 14.3 seconds      INR 1.07    Narrative:      INR Therapeutic Range    Indication                                             INR Range      Atrial Fibrillation                                               2.0-3.0  Hypercoagulable State                                    2.0.2.3  Left " Ventricular Asist Device                            2.0-3.0  Mechanical Heart Valve                                  -    Aortic(with afib, MI, embolism, HF, LA enlargement,    and/or coagulopathy)                                     2.0-3.0 (2.5-3.5)     Mitral                                                             2.5-3.5  Prosthetic/Bioprosthetic Heart Valve               2.0-3.0  Venous thromboembolism (VTE: VT, PE        2.0-3.0    APTT [[id number]]  (Normal) Collected: 05/22/25 1406    Lab Status: Final result Specimen: Blood from Arm, Right Updated: 05/22/25 1425     PTT 27 seconds     Lactic acid, plasma (w/reflex if result > 2.0) [[id number]]  (Normal) Collected: 05/22/25 1320    Lab Status: Final result Specimen: Blood from Arm, Left Updated: 05/22/25 1341     LACTIC ACID 1.6 mmol/L     Narrative:      Result may be elevated if tourniquet was used during collection.    Comprehensive metabolic panel [[id number]]  (Abnormal) Collected: 05/22/25 1320    Lab Status: Final result Specimen: Blood from Arm, Left Updated: 05/22/25 1341     Sodium 137 mmol/L      Potassium 3.5 mmol/L      Chloride 104 mmol/L      CO2 23 mmol/L      ANION GAP 10 mmol/L      BUN 11 mg/dL      Creatinine 0.67 mg/dL      Glucose 110 mg/dL      Calcium 9.6 mg/dL      AST 15 U/L      ALT 8 U/L      Alkaline Phosphatase 31 U/L      Total Protein 7.3 g/dL      Albumin 4.7 g/dL      Total Bilirubin 0.66 mg/dL      eGFR 128 ml/min/1.73sq m     Narrative:      National Kid[employer] Foundation guidelines for Chronic Kidney Disease (CKD):     Stage 1 with normal or high GFR (GFR > 90 mL/min/1.73 square meters)    Stage 2 Mild CKD (GFR = 60-89 mL/min/1.73 square meters)    Stage 3A Moderate CKD (GFR = 45-59 mL/min/1.73 square meters)    Stage 3B Moderate CKD (GFR = 30-44 mL/min/1.73 square meters)    Stage 4 Severe CKD (GFR = 15-29 mL/min/1.73 square meters)    Stage 5 End Stage CKD (GFR <15 mL/min/1.73 square meters)  Note: GFR calculation  is accurate only with a steady state creatinine    Lipase [[id number]]  (Normal) Collected: 05/22/25 1320    Lab Status: Final result Specimen: Blood from Arm, Left Updated: 05/22/25 1341     Lipase 28 u/L     CBC and differential [[id number]]  (Abnormal) Collected: 05/22/25 1320    Lab Status: Final result Specimen: Blood from Arm, Left Updated: 05/22/25 1336     WBC 4.61 Thousand/uL      RBC 3.20 Million/uL      Hemoglobin 4.4 g/dL      Hematocrit 19.6 %      MCV 61 fL      MCH 13.8 pg      MCHC 22.4 g/dL      RDW 22.7 %      MPV 9.0 fL      Platelets 214 Thousands/uL      nRBC 0 /100 WBCs      Segmented % 62 %      Immature Grans % 0 %      Lymphocytes % 21 %      Monocytes % 16 %      Eosinophils Relative 1 %      Basophils Relative 0 %      Absolute Neutrophils 2.82 Thousands/µL      Absolute Immature Grans 0.02 Thousand/uL      Absolute Lymphocytes 0.98 Thousands/µL      Absolute Monocytes 0.73 Thousand/µL      Eosinophils Absolute 0.05 Thousand/µL      Basophils Absolute 0.01 Thousands/µL     Narrative:      This is an appended report.  These results have been appended to a previously verified report.            CT high volume bleeding scan abdomen pelvis   Final Interpretation by Leti Richard MD (05/22 1458)      No CT evidence of active high-volume gastrointestinal hemorrhage.      Mild splenomegaly.      The study was marked in EPIC for immediate notification.      Workstation performed: [id number]             Procedures    ED Medication and Procedure Management   Prior to Admission Medications   Prescriptions Last Dose Informant Patient Reported? Taking?   hydrocortisone 1 % ointment   No No   Sig: Apply topically 2 (two) times a day for 5 days      Facility-Administered Medications: None     Current Discharge Medication List        CONTINUE these medications which have NOT CHANGED    Details   hydrocortisone 1 % ointment Apply topically 2 (two) times a day for 5 days  Qty: 30 g, Refills: 0    Associated  Diagnoses: Irritant dermatitis           No discharge procedures on file.  ED SEPSIS DOCUMENTATION   Time reflects when diagnosis was documented in both MDM as applicable and the Disposition within this note       Time User Action Codes Description Comment    5/22/2025  4:13 PM Curt Gracia [D64.9] Symptomatic anemia     5/22/2025  4:13 PM Curt Gracia [R10.10] Pain of upper abdomen     5/22/2025  4:13 PM Curt Gracia [K59.00] Constipation, unspecified constipation type     5/22/2025  4:13 PM Curt Gracia [Z87.19] History of rectal bleeding     5/22/2025  6:14 PM Shelia Greer Add [Z13.9] Encounter for screening involving social determinants of health (SDoH)                      [1] No past medical history on file.  [2] No past surgical history on file.  [3] No family history on file.  [4]   Social History  Tobacco Use    Smoking status: Former     Types: Cigarettes    Smokeless tobacco: Never   Vaping Use    Vaping status: Every Day    Substances: Nicotine, Flavoring   Substance Use Topics    Alcohol use: Not Currently    Drug use: Yes     Types: Marijuana        Curt Gracia MD  05/22/25 6338

## 2025-05-22 NOTE — H&P
"H&P - Hospitalist   Name: Danny Nova 29 y.o. male I MRN: [id number]  Unit/Bed#: ED 04 I Date of Admission: 5/22/2025   Date of Service: 5/22/2025 I Hospital Day: 0     Assessment & Plan  Symptomatic anemia  POA with abdominal pain, lightheadedness and dyspnea on exertion. Was just seen by GI for his chronic constipation. Denies any recent hematochezia, hematemesis, hematuria or hemoptysis. Had some blood in BM a month ago but since he cut out caffeine, this has stopped  Hgb 4.4 on admission, 1 year ago hgb was 10.3   Ct high volume bleed scan without GI bleed   Given 1 unit PRBC in ED, 2 other units ordered  Protonix iv bid   Will do clear liquids for now, npo after midnight for possible EGD   GI consulted  Trend hgb q6h  Iron panel, vitamin b12 and folate pending   Chronic constipation  Not on any maintenance therapy outpatient. Outpatient GI recommending diet changes  Last bm was day of admission but stated it was small and loose   Did receive lactulose during past ER visits for same  GI consulted   Will need bowel regimen on discharge     VTE Pharmacologic Prophylaxis: VTE Score: 0 Low Risk (Score 0-2) - Encourage Ambulation.  Code Status: No Order   Discussion with family: Updated  (wife) via phone.    Anticipated Length of Stay: Patient will be admitted on an inpatient basis with an anticipated length of stay of greater than 2 midnights secondary to anemia requiring gi consult, blood transfusion and anemia work up.    History of Present Illness   Chief Complaint: \"I have been having abdominal pain and worsening constipation\"    Danny Nova is a 29 y.o. male with a PMH of anemia, constipation who presents with abdominal pain. Patient states that he has been having trouble with constipation for years and was in the ER for this in the past. Was seen by Dr. Jones yesterday where they recommended to continue with diet changes for his constipation. States that today he was doubled over in pain " while trying to have a bowel movement. Described this by saying it felt like he had hot lava in his stomach. Patient presented secondary to constipation and abdominal pain, and was incidentally found to have worsening anemia. States last year his level was a 10. Admits to having worsening dyspnea on exertion, fatigue and lightheadedness. Denies any hematochezia, hematemesis, hematuria or hemoptysis.     Review of Systems   Constitutional:  Positive for fatigue. Negative for fever.   HENT:  Negative for congestion.    Respiratory:  Positive for shortness of breath. Negative for cough, chest tightness and wheezing.    Cardiovascular:  Negative for chest pain, palpitations and leg swelling.   Gastrointestinal:  Positive for abdominal pain and constipation. Negative for abdominal distention, blood in stool, diarrhea, nausea and vomiting.   Genitourinary:  Negative for difficulty urinating and dysuria.   Musculoskeletal:  Negative for arthralgias and back pain.   Skin:  Negative for color change and wound.   Neurological:  Positive for weakness and light-headedness. Negative for dizziness, syncope and headaches.   Psychiatric/Behavioral:  Negative for agitation, behavioral problems and confusion.        Historical Information   Past Medical History[1]  Past Surgical History[2]  Social History[3]  E-Cigarette/Vaping    E-Cigarette Use Current Every Day User      E-Cigarette/Vaping Substances    Nicotine Yes     Flavoring Yes      Family History[4]  Social History:  Marital Status: Single   Patient Pre-hospital Living Situation: Home  Patient Pre-hospital Level of Mobility: walks  Patient Pre-hospital Diet Restrictions: none    Meds/Allergies   I have reviewed home medications with patient personally.  Prior to Admission medications    Medication Sig Start Date End Date Taking? Authorizing Provider   hydrocortisone 1 % ointment Apply topically 2 (two) times a day for 5 days 5/7/23 5/12/23  Andrea Jensen, DO     No Known  Allergies    Objective :  Temp:  [98.1 °F (36.7 °C)-98.4 °F (36.9 °C)] 98.3 °F (36.8 °C)  HR:  [] 79  BP: (114-142)/(55-74) 125/58  Resp:  [12-17] 16  SpO2:  [99 %-100 %] 100 %  O2 Device: None (Room air)    Physical Exam  Vitals reviewed.   Constitutional:       General: He is not in acute distress.     Appearance: Normal appearance. He is not ill-appearing.   HENT:      Head: Normocephalic and atraumatic.      Nose: Nose normal.      Mouth/Throat:      Mouth: Mucous membranes are moist.      Pharynx: Oropharynx is clear.     Eyes:      Extraocular Movements: Extraocular movements intact.      Conjunctiva/sclera: Conjunctivae normal.       Cardiovascular:      Rate and Rhythm: Normal rate and regular rhythm.      Pulses: Normal pulses.      Heart sounds: Normal heart sounds. No murmur heard.  Pulmonary:      Effort: Pulmonary effort is normal. No respiratory distress.      Breath sounds: Normal breath sounds. No wheezing.   Abdominal:      General: Abdomen is flat. Bowel sounds are normal. There is no distension.      Palpations: Abdomen is soft.      Tenderness: There is no abdominal tenderness. There is no guarding.     Musculoskeletal:         General: Normal range of motion.      Cervical back: Normal range of motion.      Right lower leg: No edema.      Left lower leg: No edema.     Skin:     General: Skin is warm.      Coloration: Skin is pale.     Neurological:      General: No focal deficit present.      Mental Status: He is alert and oriented to person, place, and time. Mental status is at baseline.      Motor: No weakness.     Psychiatric:         Mood and Affect: Mood normal.         Behavior: Behavior normal.         Thought Content: Thought content normal.         Judgment: Judgment normal.         Lines/Drains:      Lab Results: I have reviewed the following results:  Results from last 7 days   Lab Units 05/22/25  1320   WBC Thousand/uL 4.61   HEMOGLOBIN g/dL 4.4*   HEMATOCRIT % 19.6*  "  PLATELETS Thousands/uL 214   SEGS PCT % 62   LYMPHO PCT % 21   MONO PCT % 16*   EOS PCT % 1     Results from last 7 days   Lab Units 05/22/25  1320   SODIUM mmol/L 137   POTASSIUM mmol/L 3.5   CHLORIDE mmol/L 104   CO2 mmol/L 23   BUN mg/dL 11   CREATININE mg/dL 0.67   ANION GAP mmol/L 10   CALCIUM mg/dL 9.6   ALBUMIN g/dL 4.7   TOTAL BILIRUBIN mg/dL 0.66   ALK PHOS U/L 31*   ALT U/L 8   AST U/L 15   GLUCOSE RANDOM mg/dL 110     Results from last 7 days   Lab Units 05/22/25  1406   INR  1.07         No results found for: \"HGBA1C\"  Results from last 7 days   Lab Units 05/22/25  1320   LACTIC ACID mmol/L 1.6       Imaging Results Review: I reviewed radiology reports from this admission including: CT abdomen/pelvis.    Administrative Statements     ** Please Note: This note has been constructed using a voice recognition system. **         [1] No past medical history on file.  [2] No past surgical history on file.  [3]   Social History  Tobacco Use    Smoking status: Former     Types: Cigarettes    Smokeless tobacco: Never   Vaping Use    Vaping status: Every Day    Substances: Nicotine, Flavoring   Substance and Sexual Activity    Alcohol use: Not Currently    Drug use: Yes     Types: Marijuana   [4] No family history on file.    "

## 2025-05-22 NOTE — ASSESSMENT & PLAN NOTE
Not on any maintenance therapy outpatient. Outpatient GI recommending diet changes  Last bm was day of admission but stated it was small and loose   Did receive lactulose during past ER visits for same  GI consulted   Will need bowel regimen on discharge

## 2025-05-22 NOTE — ASSESSMENT & PLAN NOTE
POA with abdominal pain, lightheadedness and dyspnea on exertion. Was just seen by GI for his chronic constipation. Denies any recent hematochezia, hematemesis, hematuria or hemoptysis. Had some blood in BM a month ago but since he cut out caffeine, this has stopped  Hgb 4.4 on admission, 1 year ago hgb was 10.3   Ct high volume bleed scan without GI bleed   Given 1 unit PRBC in ED, 2 other units ordered  Protonix iv bid   Will do clear liquids for now, npo after midnight for possible EGD   GI consulted  Trend hgb q6h  Iron panel, vitamin b12 and folate pending

## 2025-05-22 NOTE — PLAN OF CARE
Problem: PAIN - ADULT  Goal: Verbalizes/displays adequate comfort level or baseline comfort level  Description: Interventions:  - Encourage patient to monitor pain and request assistance  - Assess pain using appropriate pain scale  - Administer analgesics as ordered based on type and severity of pain and evaluate response  - Implement non-pharmacological measures as appropriate and evaluate response  - Consider cultural and social influences on pain and pain management  - Notify physician/advanced practitioner if interventions unsuccessful or patient reports new pain  - Educate patient/family on pain management process including their role and importance of  reporting pain   - Provide non-pharmacologic/complimentary pain relief interventions  Outcome: Progressing     Problem: INFECTION - ADULT  Goal: Absence or prevention of progression during hospitalization  Description: INTERVENTIONS:  - Assess and monitor for signs and symptoms of infection  - Monitor lab/diagnostic results  - Monitor all insertion sites, i.e. indwelling lines, tubes, and drains  - Monitor endotracheal if appropriate and nasal secretions for changes in amount and color  - [place] appropriate cooling/warming therapies per order  - Administer medications as ordered  - Instruct and encourage patient and family to use good hand hygiene technique  - Identify and instruct in appropriate isolation precautions for identified infection/condition  Outcome: Progressing  Goal: Absence of fever/infection during neutropenic period  Description: INTERVENTIONS:  - Monitor WBC  - Perform strict hand hygiene  - Limit to healthy visitors only  - No plants, dried, fresh or silk flowers with segura in patient room  Outcome: Progressing     Problem: SAFETY ADULT  Goal: Patient will remain free of falls  Description: INTERVENTIONS:  - Educate patient/family on patient safety including physical limitations  - Instruct patient to call for assistance with activity   -  Consider consulting OT/PT to assist with strengthening/mobility based on AM PAC & JH-HLM score  - Consult OT/PT to assist with strengthening/mobility   - Keep Call bell within reach  - Keep bed low and locked with side rails adjusted as appropriate  - Keep care items and personal belongings within reach  - Initiate and maintain comfort rounds  - Make Fall Risk Sign visible to staff  - Offer Toileting every  Hours, in advance of need  - Initiate/Maintain alarm  - Obtain necessary fall risk management equipment:   - Apply yellow socks and bracelet for high fall risk patients  - Consider moving patient to room near nurses station  Outcome: Progressing  Goal: Maintain or return to baseline ADL function  Description: INTERVENTIONS:  -  Assess patient's ability to carry out ADLs; assess patient's baseline for ADL function and identify physical deficits which impact ability to perform ADLs (bathing, care of mouth/teeth, toileting, grooming, dressing, etc.)  - Assess/evaluate cause of self-care deficits   - Assess range of motion  - Assess patient's mobility; develop plan if impaired  - Assess patient's need for assistive devices and provide as appropriate  - Encourage maximum independence but intervene and supervise when necessary  - Involve family in performance of ADLs  - Assess for home care needs following discharge   - Consider OT consult to assist with ADL evaluation and planning for discharge  - Provide patient education as appropriate  - Monitor functional capacity and physical performance, use of AM PAC & JH-HLM   - Monitor gait, balance and fatigue with ambulation    Outcome: Progressing  Goal: Maintains/Returns to pre admission functional level  Description: INTERVENTIONS:  - Perform AM-PAC 6 Click Basic Mobility/ Daily Activity assessment daily.  - Set and communicate daily mobility goal to care team and patient/family/caregiver.   - Collaborate with rehabilitation services on mobility goals if consulted  -  Perform Range of Motion times a day.  - Reposition patient every  hours.  - Dangle patient  times a day  - Stand patient  times a day  - Ambulate patient  times a day  - Out of bed to chair  times a day   - Out of bed for meals  times a day  - Out of bed for toileting  - Record patient progress and toleration of activity level   Outcome: Progressing     Problem: DISCHARGE PLANNING  Goal: Discharge to home or other facility with appropriate resources  Description: INTERVENTIONS:  - Identify barriers to discharge w/patient and caregiver  - Arrange for needed discharge resources and transportation as appropriate  - Identify discharge learning needs (meds, wound care, etc.)  - Arrange for interpretive services to assist at discharge as needed  - Refer to Case Management Department for coordinating discharge planning if the patient needs post-hospital services based on physician/advanced practitioner order or complex needs related to functional status, cognitive ability, or social support system  Outcome: Progressing     Problem: Knowledge Deficit  Goal: Patient/family/caregiver demonstrates understanding of disease process, treatment plan, medications, and discharge instructions  Description: Complete learning assessment and assess knowledge base.  Interventions:  - Provide teaching at level of understanding  - Provide teaching via preferred learning methods  Outcome: Progressing

## 2025-05-23 ENCOUNTER — ANESTHESIA EVENT (INPATIENT)
Dept: GASTROENTEROLOGY | Facility: HOSPITAL | Age: 30
End: 2025-05-23

## 2025-05-23 ENCOUNTER — ANESTHESIA (INPATIENT)
Dept: GASTROENTEROLOGY | Facility: HOSPITAL | Age: 30
End: 2025-05-23

## 2025-05-23 ENCOUNTER — APPOINTMENT (INPATIENT)
Dept: GASTROENTEROLOGY | Facility: HOSPITAL | Age: 30
DRG: 663 | End: 2025-05-23
Attending: PHYSICIAN ASSISTANT
Payer: COMMERCIAL

## 2025-05-23 VITALS
SYSTOLIC BLOOD PRESSURE: 127 MMHG | HEART RATE: 67 BPM | WEIGHT: 145 LBS | DIASTOLIC BLOOD PRESSURE: 60 MMHG | BODY MASS INDEX: 21.48 KG/M2 | RESPIRATION RATE: 21 BRPM | TEMPERATURE: 98 F | HEIGHT: 69 IN | OXYGEN SATURATION: 98 %

## 2025-05-23 PROBLEM — K62.5 BRBPR (BRIGHT RED BLOOD PER RECTUM): Status: ACTIVE | Noted: 2025-05-23

## 2025-05-23 PROBLEM — F17.210 CIGARETTE SMOKER: Status: ACTIVE | Noted: 2025-05-23

## 2025-05-23 PROBLEM — Z87.891 HISTORY OF NICOTINE VAPING: Status: ACTIVE | Noted: 2025-05-23

## 2025-05-23 LAB
ABO GROUP BLD BPU: NORMAL
ANION GAP SERPL CALCULATED.3IONS-SCNC: 7 MMOL/L (ref 4–13)
BILIRUB UR QL STRIP: ABNORMAL
BPU ID: NORMAL
BUN SERPL-MCNC: 9 MG/DL (ref 5–25)
CALCIUM SERPL-MCNC: 9.1 MG/DL (ref 8.4–10.2)
CHLORIDE SERPL-SCNC: 105 MMOL/L (ref 96–108)
CLARITY UR: CLEAR
CO2 SERPL-SCNC: 24 MMOL/L (ref 21–32)
COLOR UR: YELLOW
CREAT SERPL-MCNC: 0.65 MG/DL (ref 0.6–1.3)
CROSSMATCH: NORMAL
ERYTHROCYTE [DISTWIDTH] IN BLOOD BY AUTOMATED COUNT: 26.4 % (ref 11.6–15.1)
GFR SERPL CREATININE-BSD FRML MDRD: 129 ML/MIN/1.73SQ M
GLUCOSE SERPL-MCNC: 97 MG/DL (ref 65–140)
GLUCOSE UR STRIP-MCNC: NEGATIVE MG/DL
HCT VFR BLD AUTO: 25.8 % (ref 36.5–49.3)
HCT VFR BLD AUTO: 28.6 % (ref 36.5–49.3)
HGB BLD-MCNC: 7 G/DL (ref 12–17)
HGB BLD-MCNC: 7.7 G/DL (ref 12–17)
HGB UR QL STRIP.AUTO: NEGATIVE
IGA SERPL-MCNC: 223 MG/DL (ref 66–433)
KETONES UR STRIP-MCNC: NEGATIVE MG/DL
LEUKOCYTE ESTERASE UR QL STRIP: NEGATIVE
MAGNESIUM SERPL-MCNC: 2.1 MG/DL (ref 1.9–2.7)
MCH RBC QN AUTO: 18.6 PG (ref 26.8–34.3)
MCHC RBC AUTO-ENTMCNC: 27.1 G/DL (ref 31.4–37.4)
MCV RBC AUTO: 69 FL (ref 82–98)
NITRITE UR QL STRIP: NEGATIVE
PH UR STRIP.AUTO: 6.5 [PH]
PLATELET # BLD AUTO: 156 THOUSANDS/UL (ref 149–390)
POTASSIUM SERPL-SCNC: 3.3 MMOL/L (ref 3.5–5.3)
PROT UR STRIP-MCNC: NEGATIVE MG/DL
RBC # BLD AUTO: 3.76 MILLION/UL (ref 3.88–5.62)
SODIUM SERPL-SCNC: 136 MMOL/L (ref 135–147)
SP GR UR STRIP.AUTO: 1.02 (ref 1–1.03)
UNIT DISPENSE STATUS: NORMAL
UNIT PRODUCT CODE: NORMAL
UNIT PRODUCT VOLUME: 350 ML
UNIT RH: NORMAL
UROBILINOGEN UR QL STRIP.AUTO: 0.2 E.U./DL
WBC # BLD AUTO: 5.84 THOUSAND/UL (ref 4.31–10.16)

## 2025-05-23 PROCEDURE — 85014 HEMATOCRIT: CPT | Performed by: FAMILY MEDICINE

## 2025-05-23 PROCEDURE — 0DB98ZX EXCISION OF DUODENUM, VIA NATURAL OR ARTIFICIAL OPENING ENDOSCOPIC, DIAGNOSTIC: ICD-10-PCS | Performed by: FAMILY MEDICINE

## 2025-05-23 PROCEDURE — NC001 PR NO CHARGE

## 2025-05-23 PROCEDURE — 86364 TISS TRNSGLTMNASE EA IG CLAS: CPT | Performed by: PHYSICIAN ASSISTANT

## 2025-05-23 PROCEDURE — 80048 BASIC METABOLIC PNL TOTAL CA: CPT

## 2025-05-23 PROCEDURE — 88342 IMHCHEM/IMCYTCHM 1ST ANTB: CPT | Performed by: STUDENT IN AN ORGANIZED HEALTH CARE EDUCATION/TRAINING PROGRAM

## 2025-05-23 PROCEDURE — 81003 URINALYSIS AUTO W/O SCOPE: CPT

## 2025-05-23 PROCEDURE — 85018 HEMOGLOBIN: CPT | Performed by: FAMILY MEDICINE

## 2025-05-23 PROCEDURE — 83735 ASSAY OF MAGNESIUM: CPT

## 2025-05-23 PROCEDURE — 0DJD8ZZ INSPECTION OF LOWER INTESTINAL TRACT, VIA NATURAL OR ARTIFICIAL OPENING ENDOSCOPIC: ICD-10-PCS | Performed by: FAMILY MEDICINE

## 2025-05-23 PROCEDURE — 82784 ASSAY IGA/IGD/IGG/IGM EACH: CPT | Performed by: PHYSICIAN ASSISTANT

## 2025-05-23 PROCEDURE — 99239 HOSP IP/OBS DSCHRG MGMT >30: CPT

## 2025-05-23 PROCEDURE — 88305 TISSUE EXAM BY PATHOLOGIST: CPT | Performed by: STUDENT IN AN ORGANIZED HEALTH CARE EDUCATION/TRAINING PROGRAM

## 2025-05-23 PROCEDURE — 0DB68ZX EXCISION OF STOMACH, VIA NATURAL OR ARTIFICIAL OPENING ENDOSCOPIC, DIAGNOSTIC: ICD-10-PCS | Performed by: FAMILY MEDICINE

## 2025-05-23 PROCEDURE — 83655 ASSAY OF LEAD: CPT

## 2025-05-23 PROCEDURE — 85027 COMPLETE CBC AUTOMATED: CPT

## 2025-05-23 RX ORDER — FERROUS SULFATE 324(65)MG
324 TABLET, DELAYED RELEASE (ENTERIC COATED) ORAL
Qty: 60 TABLET | Refills: 0 | Status: SHIPPED | OUTPATIENT
Start: 2025-05-23

## 2025-05-23 RX ORDER — SODIUM CHLORIDE, SODIUM LACTATE, POTASSIUM CHLORIDE, CALCIUM CHLORIDE 600; 310; 30; 20 MG/100ML; MG/100ML; MG/100ML; MG/100ML
125 INJECTION, SOLUTION INTRAVENOUS CONTINUOUS
Status: CANCELLED | OUTPATIENT
Start: 2025-05-23

## 2025-05-23 RX ORDER — RIBOFLAVIN (VITAMIN B2) 100 MG
100 TABLET ORAL DAILY
Qty: 30 TABLET | Refills: 0 | Status: SHIPPED | OUTPATIENT
Start: 2025-05-23

## 2025-05-23 RX ORDER — LIDOCAINE HYDROCHLORIDE 20 MG/ML
INJECTION, SOLUTION EPIDURAL; INFILTRATION; INTRACAUDAL; PERINEURAL AS NEEDED
Status: DISCONTINUED | OUTPATIENT
Start: 2025-05-23 | End: 2025-05-23

## 2025-05-23 RX ORDER — TAMSULOSIN HYDROCHLORIDE 0.4 MG/1
0.4 CAPSULE ORAL
Status: DISCONTINUED | OUTPATIENT
Start: 2025-05-23 | End: 2025-05-23

## 2025-05-23 RX ORDER — HYDROCORTISONE 25 MG/G
CREAM TOPICAL 4 TIMES DAILY PRN
Status: DISCONTINUED | OUTPATIENT
Start: 2025-05-23 | End: 2025-05-23 | Stop reason: HOSPADM

## 2025-05-23 RX ORDER — POTASSIUM CHLORIDE 14.9 MG/ML
20 INJECTION INTRAVENOUS ONCE
Status: COMPLETED | OUTPATIENT
Start: 2025-05-23 | End: 2025-05-23

## 2025-05-23 RX ORDER — SODIUM CHLORIDE, SODIUM LACTATE, POTASSIUM CHLORIDE, CALCIUM CHLORIDE 600; 310; 30; 20 MG/100ML; MG/100ML; MG/100ML; MG/100ML
INJECTION, SOLUTION INTRAVENOUS CONTINUOUS PRN
Status: DISCONTINUED | OUTPATIENT
Start: 2025-05-23 | End: 2025-05-23

## 2025-05-23 RX ORDER — PROPOFOL 10 MG/ML
INJECTION, EMULSION INTRAVENOUS AS NEEDED
Status: DISCONTINUED | OUTPATIENT
Start: 2025-05-23 | End: 2025-05-23

## 2025-05-23 RX ADMIN — LIDOCAINE HYDROCHLORIDE 100 MG: 20 INJECTION, SOLUTION EPIDURAL; INFILTRATION; INTRACAUDAL; PERINEURAL at 12:45

## 2025-05-23 RX ADMIN — Medication 40 MG: at 12:56

## 2025-05-23 RX ADMIN — PANTOPRAZOLE SODIUM 40 MG: 40 INJECTION, POWDER, FOR SOLUTION INTRAVENOUS at 08:21

## 2025-05-23 RX ADMIN — PROPOFOL 100 MG: 10 INJECTION, EMULSION INTRAVENOUS at 12:50

## 2025-05-23 RX ADMIN — PROPOFOL 100 MG: 10 INJECTION, EMULSION INTRAVENOUS at 12:47

## 2025-05-23 RX ADMIN — POTASSIUM CHLORIDE 20 MEQ: 14.9 INJECTION, SOLUTION INTRAVENOUS at 08:21

## 2025-05-23 RX ADMIN — PROPOFOL 100 MG: 10 INJECTION, EMULSION INTRAVENOUS at 12:45

## 2025-05-23 RX ADMIN — PROPOFOL 120 MCG/KG/MIN: 10 INJECTION, EMULSION INTRAVENOUS at 12:51

## 2025-05-23 RX ADMIN — IRON SUCROSE 200 MG: 20 INJECTION, SOLUTION INTRAVENOUS at 09:10

## 2025-05-23 RX ADMIN — SODIUM CHLORIDE, SODIUM LACTATE, POTASSIUM CHLORIDE, AND CALCIUM CHLORIDE: .6; .31; .03; .02 INJECTION, SOLUTION INTRAVENOUS at 12:30

## 2025-05-23 NOTE — ASSESSMENT & PLAN NOTE
Appears to have longstanding microcytic anemia with a long period of BRBPR which sounds like hemorrhoid/fissure bleeding likely related to constipation. He reports a recent colonoscopy in the last 4-6 months but does not recall the results or where it was done (sounds like [facility]).   -BID PPI  -EGD today to assess for UGI source of bleeding and celiac. Will also order celiac serology awhile  -possible colonoscopy early next week if needed vs outpatient if stable  -trend hgb, transfuse as needed. Pt on IV iron infustion  -further recommendations depending on EGD findings

## 2025-05-23 NOTE — CASE MANAGEMENT
Case Management Discharge Planning Note    Patient name Danny Nova  Location /-01 MRN [id number]  : 1995 Date 2025       Current Admission Date: 2025  Current Admission Diagnosis:Symptomatic anemia   Patient Active Problem List    Diagnosis Date Noted    BRBPR (bright red blood per rectum) 2025    Symptomatic anemia 2025    Chronic constipation 2025      LOS (days): 1  Geometric Mean LOS (GMLOS) (days): 2.8  Days to GMLOS:2     OBJECTIVE:  Risk of Unplanned Readmission Score: 7.17         Current admission status: Inpatient   Preferred Pharmacy:   [facility]/pharmacy #[id number] - [place], [place] - [address] [facility]  [address] [facility] [address]  Phone: [phone] Fax: [phone]    [address]  [address] [facility] [address]  Phone: [phone] Fax: [phone]    Primary Care Provider: No primary care provider on file.    Primary Insurance:   Secondary Insurance:     DISCHARGE DETAILS:     Per financial counselors:   They referred patent to PATHS   Per [employer]   Household Size :               3                                               Annual Income :                24,000                                   FPL :       91%         CM to follow patient's care and discharge needs.

## 2025-05-23 NOTE — UTILIZATION REVIEW
"Initial Clinical Review    Admission: Date/Time/Statement:   Admission Orders (From admission, onward)       Ordered        05/22/25 1634  INPATIENT ADMISSION  Once                          Orders Placed This Encounter   Procedures    INPATIENT ADMISSION     Standing Status:   Standing     Number of Occurrences:   1     Level of Care:   Med Surg [16]     Estimated length of stay:   More than 2 Midnights     Certification:   I certify that inpatient services are medically necessary for this patient for a duration of greater than two midnights. See H&P and MD Progress Notes for additional information about the patient's course of treatment.     ED Arrival Information       Expected   -    Arrival   5/22/2025 13:09    Acuity   Urgent              Means of arrival   Walk-In    Escorted by   Self    Service   Hospitalist    Admission type   Emergency              Arrival complaint   Abdominal pain, Diarrhea, PT states it feels like a hand covered in lava is moving around in his stomach             Chief Complaint   Patient presents with    Abdominal Pain     Patient reporting seen at gastro yesterday for constipation issues from March. Patient started today with abdominal pain that \"feels like a lava hand\" in his stomach. Denies N/V. Had episode of diarrhea.       Initial Presentation: 29 y.o. male presents to ED from home with abdominal pain.  Has  long time constipation issues and has been seen in ED previously for this.  Saw  GI the day prior to admission  and diet changes recommended.   Was doubled over in pain the day of  admission while trying to have a  BM.   States it felt like a  hot lava in his stomach.   Labs  incidentally  revealed worsening anemia.  Hemoglobin 1 year ago was 10 ,  now  4.4.  Admits  to worsening dyspnea on  exertion, fatigue and lightheadedness.   PMH  is anemia.  Admit  Ip w ith Symptomatic anemia, Chronic constipation and plan is   GI consult, S/P  1 U PRBC in ED and additional 2 U PRBC " ordered, monitor labs, bowel regimen, IV  protonix, cl liq diet  and hmgb  Q 6 hrs.      Anticipated Length of Stay/Certification Statement:  Patient will be admitted on an inpatient basis with an anticipated length of stay of greater than 2 midnights secondary to anemia requiring gi consult, blood transfusion and anemia work up.     Date:    5/23   Day 2:   GI consult  Plan  EGD  today.  Possible colonoscopy  next week.   Continue  IV  iron, IV protonix    EGD  IMPRESSION:  The esophagus, stomach and duodenum appeared normal.  Small hiatal hernia  Performed forceps biopsies in the antrum, duodenal bulb and 2nd part of the duodenum to rule out celiac disease and H. pylori  No evidence of active bleeding or old blood in the UGI tract.    Flex sigmoid  FINDINGS:  All observed locations appeared normal, including the terminal ileum and entire colon. Limited prep.  Internal large hemorrhoids observed during retroflexion; no bleeding was observed          ED Treatment-Medication Administration from 05/22/2025 1306 to 05/22/2025 1700         Date/Time Order Dose Route Action     05/22/2025 1406 pantoprazole (PROTONIX) injection 40 mg 40 mg Intravenous Given     05/22/2025 1416 iohexol (OMNIPAQUE) 350 MG/ML injection (MULTI-DOSE) 100 mL 100 mL Intravenous Given            Scheduled Medications:  iron sucrose, 200 mg, Intravenous, Daily  melatonin, 6 mg, Oral, HS  pantoprazole, 40 mg, Intravenous, Q12H DARÍO  potassium chloride, 20 mEq, Intravenous, Once      Continuous IV Infusions:     PRN Meds:     ED Triage Vitals [05/22/25 1311]   Temperature Pulse Respirations Blood Pressure SpO2 Pain Score   98.1 °F (36.7 °C) (!) 108 17 142/74 100 % 4     Weight (last 2 days)       Date/Time Weight    05/22/25 1311 65.8 (145)            Vital Signs (last 3 days)       Date/Time Temp Pulse Resp BP MAP (mmHg) SpO2 O2 Device Patient Position - Orthostatic VS Pain    05/23/25 0140 98.1 °F (36.7 °C) 62 19 108/55 -- 99 % None (Room air) --  --    05/23/25 0020 98.2 °F (36.8 °C) 70 19 116/63 81 -- -- -- No Pain    05/22/25 2350 98.1 °F (36.7 °C) 66 20 128/67 -- 99 % None (Room air) -- No Pain    05/22/25 2335 97.5 °F (36.4 °C) 73 20 123/60 -- 100 % None (Room air) -- No Pain    05/22/25 2310 98.3 °F (36.8 °C) 62 20 137/78 -- -- -- -- --    05/22/25 2309 98.3 °F (36.8 °C) 62 20 137/78 -- -- -- -- --    05/22/25 2050 98 °F (36.7 °C) 73 18 118/72 87 100 % None (Room air) -- --    05/22/25 2020 98.1 °F (36.7 °C) 64 18 129/71 90 -- -- -- --    05/22/25 2016 -- -- -- -- -- 100 % None (Room air) -- No Pain    05/22/25 1839 98.1 °F (36.7 °C) 68 16 124/56 90 100 % None (Room air) -- No Pain    05/22/25 1815 98.1 °F (36.7 °C) 65 16 123/70 -- 100 % None (Room air) -- --    05/22/25 1800 -- -- -- -- -- -- -- -- No Pain    05/22/25 1745 97.9 °F (36.6 °C) 69 16 125/65 -- 100 % None (Room air) -- --    05/22/25 1733 98.1 °F (36.7 °C) 69 16 114/66 -- 100 % None (Room air) -- --    05/22/25 1651 98.1 °F (36.7 °C) 66 20 128/67 -- 99 % -- -- --    05/22/25 1630 -- 75 16 117/56 80 100 % None (Room air) Lying --    05/22/25 1600 98.3 °F (36.8 °C) 79 16 125/58 85 100 % None (Room air) -- --    05/22/25 1545 -- 75 16 126/58 84 100 % -- -- --    05/22/25 1533 98.4 °F (36.9 °C) 77 16 127/59 -- 99 % None (Room air) -- --    05/22/25 1530 -- 74 16 118/58 83 100 % -- -- --    05/22/25 1518 98.1 °F (36.7 °C) 75 14 114/58 -- 100 % None (Room air) -- --    05/22/25 1513 98.3 °F (36.8 °C) 76 16 115/57 -- 100 % None (Room air) -- --    05/22/25 1508 98.2 °F (36.8 °C) 81 16 115/55 -- 100 % None (Room air) -- --    05/22/25 1453 98.1 °F (36.7 °C) 73 14 128/57 -- -- -- -- --    05/22/25 1445 98.4 °F (36.9 °C) 77 14 117/59 84 100 % None (Room air) Lying --    05/22/25 1415 -- 76 12 127/67 91 100 % None (Room air) Lying --    05/22/25 1332 -- -- -- -- -- -- None (Room air) -- --    05/22/25 1311 98.1 °F (36.7 °C) 108 17 142/74 -- 100 % None (Room air) Sitting 4              Pertinent  Labs/Diagnostic Test Results:   Radiology:  CT high volume bleeding scan abdomen pelvis   Final Interpretation by Leti Richard MD (05/22 1458)      No CT evidence of active high-volume gastrointestinal hemorrhage.      Mild splenomegaly.      The study was marked in EPIC for immediate notification.      Workstation performed: [id number]           Cardiology:    GI:          Results from last 7 days   Lab Units 05/23/25  0648 05/22/25  2214 05/22/25  1320   WBC Thousand/uL 5.84  --  4.61   HEMOGLOBIN g/dL 7.0* 6.4* 4.4*   HEMATOCRIT % 25.8* 24.3* 19.6*   PLATELETS Thousands/uL 156  --  214   TOTAL NEUT ABS Thousands/µL  --   --  2.67  2.82         Results from last 7 days   Lab Units 05/23/25  0437 05/22/25  1320   SODIUM mmol/L 136 137   POTASSIUM mmol/L 3.3* 3.5   CHLORIDE mmol/L 105 104   CO2 mmol/L 24 23   ANION GAP mmol/L 7 10   BUN mg/dL 9 11   CREATININE mg/dL 0.65 0.67   EGFR ml/min/1.73sq m 129 128   CALCIUM mg/dL 9.1 9.6   MAGNESIUM mg/dL 2.1  --      Results from last 7 days   Lab Units 05/22/25  1320   AST U/L 15   ALT U/L 8   ALK PHOS U/L 31*   TOTAL PROTEIN g/dL 7.3   ALBUMIN g/dL 4.7   TOTAL BILIRUBIN mg/dL 0.66         Results from last 7 days   Lab Units 05/23/25  0437 05/22/25  1320   GLUCOSE RANDOM mg/dL 97 110                   Results from last 7 days   Lab Units 05/22/25  1406   PROTIME seconds 14.3   INR  1.07   PTT seconds 27             Results from last 7 days   Lab Units 05/22/25  1320   LACTIC ACID mmol/L 1.6                 Results from last 7 days   Lab Units 05/22/25  1320   FERRITIN ng/mL 2*   IRON ug/dL <10*   TIBC ug/dL 490*     Results from last 7 days   Lab Units 05/22/25  1320   TRANSFERRIN mg/dL 350     Results from last 7 days   Lab Units 05/23/25  0637   UNIT PRODUCT CODE  H2108A10  A6030X11  [id number]   UNIT NUMBER  [id number]  [id number]  [id number]   UNITABO  B  O  O   UNITRH  NEG  POS  POS   CROSSMATCH  Compatible  Compatible  Compatible   UNIT  DISPENSE STATUS  Presumed Trans  Presumed Trans  Presumed Trans   UNIT PRODUCT VOL ml 350  350  350         Results from last 7 days   Lab Units 05/22/25  1320   LIPASE u/L 28               Results from last 7 days   Lab Units 05/22/25  1320   TOTAL COUNTED  100               Admitting Diagnosis: Abdominal pain [R10.9]  Pain of upper abdomen [R10.10]  History of rectal bleeding [Z87.19]  Symptomatic anemia [D64.9]  Constipation, unspecified constipation type [K59.00]  Age/Sex: 29 y.o. male    Network Utilization Review Department  ATTENTION: Please call with any questions or concerns to [phone] and carefully listen to the prompts so that you are directed to the right person. All voicemails are confidential.   For Discharge needs, contact Care Management DC Support Team at [phone] opt. [phone]  Send all requests for admission clinical reviews, approved or denied determinations and any other requests to dedicated fax number below belonging to the campus where the patient is receiving treatment. List of dedicated fax numbers for the Facilities:  FACILITY NAME UR FAX NUMBER   ADMISSION DENIALS (Administrative/Medical Necessity) [phone]   DISCHARGE SUPPORT TEAM (NETWORK) [phone]   PARENT CHILD HEALTH (Maternity/NICU/Pediatrics) [phone]   [facility] [phone]   [facility] [phone]   [facility] [phone]   [facility] [phone]   [facility] [phone]   [facility] [phone]   [facility] [phone]   [facility] [phone]   [facility] [phone]   [facility] [phone]   [facility] [phone]   [facility] [phone]

## 2025-05-23 NOTE — ANESTHESIA POSTPROCEDURE EVALUATION
Post-Op Assessment Note    CV Status:  Stable    Pain management: adequate       Mental Status:  Sleepy   Hydration Status:  Euvolemic   PONV Controlled:  Controlled   Airway Patency:  Patent     Post Op Vitals Reviewed: Yes    No anethesia notable event occurred.    Staff: CRNA           Last Filed PACU Vitals:  Vitals Value Taken Time   Temp 97.3    Pulse 78    /64    Resp 16    SpO2 98%

## 2025-05-23 NOTE — DISCHARGE INSTR - AVS FIRST PAGE
Dear Danny Nova,     It was our pleasure to care for you here at [facility]. For follow up as well as any medication refills, we recommend that you follow up with your primary care physician. Here are the most important instructions/ recommendations at discharge:     Notable Medication Adjustments -   Start iron supplement daily with vitamin c  Testing Required after Discharge - ** Please contact your PCP to request testing orders for any of the testing recommended here **  None   Important follow up information -   Follow up with GI  Follow up with family doctor   Other Instructions -   Follow high fiber diet  Goal is to have bowel movement every 2-3 days. If this is not happening, then you need to get over the counter stool softeners/laxatives  Please review this entire after visit summary as additional general instructions including medication list, appointments, activity, diet, any pertinent wound care, and other additional recommendations from your care team that may be provided for you.      Sincerely,     Yasmine Durham PA-C

## 2025-05-23 NOTE — PROGRESS NOTES
Progress Note - Hospitalist   Name: Danny Nova 29 y.o. male I MRN: [id number]  Unit/Bed#: -01 I Date of Admission: 5/22/2025   Date of Service: 5/23/2025 I Hospital Day: 1    Assessment & Plan  Symptomatic anemia  POA with abdominal pain, lightheadedness and dyspnea on exertion. Was just seen by GI for his chronic constipation. Denies any recent hematochezia, hematemesis, hematuria or hemoptysis. Had some blood in BM a month ago but since he cut out caffeine, this has stopped  Hgb 4.4 on admission, 1 year ago hgb was 10.3   Ct high volume bleed scan without GI bleed   Given 3 units PRBC  Protonix iv bid   EGD and flex sig 5/23   GI consulted  Should have colonoscopy outpatient   Trend hgb q6h  Iron panel with iron <10  Start iv venofer  Transition to vit c and iron supplement on discharge   vitamin b12 and folate normal  Chronic constipation  Not on any maintenance therapy outpatient. Outpatient GI recommending diet changes  Last bm was day of admission but stated it was small and loose   Did receive lactulose during past ER visits for same  GI consulted   Will need bowel regimen on discharge     VTE Pharmacologic Prophylaxis: VTE Score: 0 Low Risk (Score 0-2) - Encourage Ambulation.    Mobility:   Basic Mobility Inpatient Raw Score: 24  JH-HLM Goal: 8: Walk 250 feet or more  JH-HLM Achieved: 6: Walk 10 steps or more  JH-HLM Goal NOT achieved. Continue with multidisciplinary rounding and encourage appropriate mobility to improve upon JH-HLM goals.    Patient Centered Rounds: I performed bedside rounds with nursing staff today.   Discussions with Specialists or Other Care Team Provider: nursing, cm, GI    Education and Discussions with Family / Patient: Patient declined call to .     Current Length of Stay: 1 day(s)  Current Patient Status: Inpatient   Certification Statement: The patient will continue to require additional inpatient hospital stay due to requiring monitoring of hgb and iv  iron  Discharge Plan: Anticipate discharge tomorrow to home.    Code Status: Level 1 - Full Code    Subjective   Patient states that he is feeling better today with less abdominal pain and more awake. Denies chest pain, shortness of breath or abdominal pain. States that he has been having gas. Agreeable to EGD and flex sig today.     Objective :  Temp:  [97.5 °F (36.4 °C)-98.4 °F (36.9 °C)] 98.1 °F (36.7 °C)  HR:  [] 62  BP: (108-142)/(55-78) 108/55  Resp:  [12-20] 19  SpO2:  [99 %-100 %] 99 %  O2 Device: None (Room air)    Body mass index is 21.41 kg/m².     Input and Output Summary (last 24 hours):     Intake/Output Summary (Last 24 hours) at 5/23/2025 0906  Last data filed at 5/23/2025 0140  Gross per 24 hour   Intake 1140 ml   Output --   Net 1140 ml       Physical Exam  Vitals reviewed.   Constitutional:       General: He is not in acute distress.     Appearance: Normal appearance. He is not ill-appearing.   HENT:      Head: Normocephalic and atraumatic.      Nose: Nose normal.      Mouth/Throat:      Mouth: Mucous membranes are moist.      Pharynx: Oropharynx is clear.     Eyes:      Extraocular Movements: Extraocular movements intact.      Conjunctiva/sclera: Conjunctivae normal.       Cardiovascular:      Rate and Rhythm: Normal rate and regular rhythm.      Pulses: Normal pulses.      Heart sounds: Normal heart sounds. No murmur heard.  Pulmonary:      Effort: Pulmonary effort is normal. No respiratory distress.      Breath sounds: Normal breath sounds. No wheezing.   Abdominal:      General: Abdomen is flat. Bowel sounds are normal. There is no distension.      Palpations: Abdomen is soft.      Tenderness: There is no abdominal tenderness. There is no guarding.     Musculoskeletal:         General: Normal range of motion.      Cervical back: Normal range of motion.      Right lower leg: No edema.      Left lower leg: No edema.     Skin:     General: Skin is warm.      Coloration: Skin is pale.      Neurological:      General: No focal deficit present.      Mental Status: He is alert and oriented to person, place, and time. Mental status is at baseline.      Motor: No weakness.     Psychiatric:         Mood and Affect: Mood normal.         Behavior: Behavior normal.         Thought Content: Thought content normal.         Judgment: Judgment normal.         Lines/Drains:      Lab Results: I have reviewed the following results:   Results from last 7 days   Lab Units 05/23/25  0648 05/22/25  2214 05/22/25  1320   WBC Thousand/uL 5.84  --  4.61   HEMOGLOBIN g/dL 7.0*   < > 4.4*   HEMATOCRIT % 25.8*   < > 19.6*   PLATELETS Thousands/uL 156  --  214   SEGS PCT %  --   --  62   LYMPHO PCT %  --   --  21  27   MONO PCT %  --   --  16*  15*   EOS PCT %  --   --  1    < > = values in this interval not displayed.     Results from last 7 days   Lab Units 05/23/25  0437 05/22/25  1320   SODIUM mmol/L 136 137   POTASSIUM mmol/L 3.3* 3.5   CHLORIDE mmol/L 105 104   CO2 mmol/L 24 23   BUN mg/dL 9 11   CREATININE mg/dL 0.65 0.67   ANION GAP mmol/L 7 10   CALCIUM mg/dL 9.1 9.6   ALBUMIN g/dL  --  4.7   TOTAL BILIRUBIN mg/dL  --  0.66   ALK PHOS U/L  --  31*   ALT U/L  --  8   AST U/L  --  15   GLUCOSE RANDOM mg/dL 97 110     Results from last 7 days   Lab Units 05/22/25  1406   INR  1.07             Results from last 7 days   Lab Units 05/22/25  1320   LACTIC ACID mmol/L 1.6       Recent Cultures (last 7 days):           Last 24 Hours Medication List:     Current Facility-Administered Medications:     iron sucrose (VENOFER) 200 mg in sodium chloride 0.9 % 100 mL IVPB, Daily    melatonin tablet 6 mg, HS    pantoprazole (PROTONIX) injection 40 mg, Q12H DARÍO    potassium chloride 20 mEq IVPB (premix), Once, Last Rate: 20 mEq (05/23/25 0821)    Administrative Statements   Today, Patient Was Seen By: Yasmine Durham PA-C    **Please Note: This note may have been constructed using a voice recognition system.**

## 2025-05-23 NOTE — PLAN OF CARE
Problem: PAIN - ADULT  Goal: Verbalizes/displays adequate comfort level or baseline comfort level  Description: Interventions:  - Encourage patient to monitor pain and request assistance  - Assess pain using appropriate pain scale  - Administer analgesics as ordered based on type and severity of pain and evaluate response  - Implement non-pharmacological measures as appropriate and evaluate response  - Consider cultural and social influences on pain and pain management  - Notify physician/advanced practitioner if interventions unsuccessful or patient reports new pain  - Educate patient/family on pain management process including their role and importance of  reporting pain   - Provide non-pharmacologic/complimentary pain relief interventions  Outcome: Progressing     Problem: INFECTION - ADULT  Goal: Absence or prevention of progression during hospitalization  Description: INTERVENTIONS:  - Assess and monitor for signs and symptoms of infection  - Monitor lab/diagnostic results  - Monitor all insertion sites, i.e. indwelling lines, tubes, and drains  - Monitor endotracheal if appropriate and nasal secretions for changes in amount and color  - [place] appropriate cooling/warming therapies per order  - Administer medications as ordered  - Instruct and encourage patient and family to use good hand hygiene technique  - Identify and instruct in appropriate isolation precautions for identified infection/condition  Outcome: Progressing  Goal: Absence of fever/infection during neutropenic period  Description: INTERVENTIONS:  - Monitor WBC  - Perform strict hand hygiene  - Limit to healthy visitors only  - No plants, dried, fresh or silk flowers with segura in patient room  Outcome: Progressing     Problem: DISCHARGE PLANNING  Goal: Discharge to home or other facility with appropriate resources  Description: INTERVENTIONS:  - Identify barriers to discharge w/patient and caregiver  - Arrange for needed discharge resources  and transportation as appropriate  - Identify discharge learning needs (meds, wound care, etc.)  - Arrange for interpretive services to assist at discharge as needed  - Refer to Case Management Department for coordinating discharge planning if the patient needs post-hospital services based on physician/advanced practitioner order or complex needs related to functional status, cognitive ability, or social support system  Outcome: Progressing     Problem: Knowledge Deficit  Goal: Patient/family/caregiver demonstrates understanding of disease process, treatment plan, medications, and discharge instructions  Description: Complete learning assessment and assess knowledge base.  Interventions:  - Provide teaching at level of understanding  - Provide teaching via preferred learning methods  Outcome: Progressing

## 2025-05-23 NOTE — PROGRESS NOTES
Patient finished using BSC and states now ready for second tap water enema. Patient noted to have large, loose, watery tan BM after first tap water enema. Second tap water enema given at this time per order and patient sitting on BSC awaiting results.

## 2025-05-23 NOTE — ASSESSMENT & PLAN NOTE
POA with abdominal pain, lightheadedness and dyspnea on exertion. Was just seen by GI for his chronic constipation. Denies any recent hematochezia, hematemesis, hematuria or hemoptysis. Had some blood in BM a month ago but since he cut out caffeine, this has stopped  Hgb 4.4 on admission, 1 year ago hgb was 10.3   Ct high volume bleed scan without GI bleed   Given 3 units PRBC  Protonix iv bid   EGD and flex sig 5/23   GI consulted  Should have colonoscopy outpatient   Trend hgb q6h  Iron panel with iron <10  Start iv venofer  Transition to vit c and iron supplement on discharge   vitamin b12 and folate normal

## 2025-05-23 NOTE — DISCHARGE SUMMARY
Discharge Summary - Hospitalist   Name: Danny Nova 29 y.o. male I MRN: [id number]  Unit/Bed#: -01 I Date of Admission: 5/22/2025   Date of Service: 5/23/2025 I Hospital Day: 1     Assessment & Plan  Symptomatic anemia  POA with abdominal pain, lightheadedness and dyspnea on exertion. Was just seen by GI for his chronic constipation. Denies any recent hematochezia, hematemesis, hematuria or hemoptysis. Had some blood in BM a month ago but since he cut out caffeine, this has stopped  Hgb 4.4 on admission, 1 year ago hgb was 10.3   Ct high volume bleed scan without GI bleed   Given 3 units PRBC  EGD and colonoscopy 5/23 - no bleeding. Hemorrhoids noted, biopsies taken   GI consulted  Hgb stable   Iron panel with iron <10  Given  iv venofer  Transition to vit c and iron supplement on discharge   vitamin b12 and folate normal  Lead level pending with anemia   Chronic constipation  Not on any maintenance therapy outpatient. Outpatient GI recommending diet changes  Last bm was day of admission but stated it was small and loose   Did receive lactulose during past ER visits for same  GI consulted   Discussed OTC bowel regimen on discharge   BRBPR (bright red blood per rectum)    Cigarette smoker  NRT   History of nicotine vaping       Medical Problems       Resolved Problems  Date Reviewed: 5/22/2025   None       Discharging Physician / Practitioner: Yasmine Durham PA-C  PCP: No primary care provider on file.  Admission Date:   Admission Orders (From admission, onward)       Ordered        05/22/25 1634  INPATIENT ADMISSION  Once                          Discharge Date: 05/23/25    Next Steps for Physician/AP Assuming Care:  Follow up hgb levels  Continue iron  Follow up with GI     Test Results Pending at Discharge (will require follow up):  Lead level    Medication Changes for Discharge & Rationale:   Continue iron  Follow up with GI   See after visit summary for reconciled discharge medications provided to  "patient and/or family.     Consultations During Hospital Stay:  GI    Procedures Performed:   EGD and colonoscopy 5/23    Significant Findings / Test Results:   CT high-volume bleed scan with no CT evidence of active high-volume GI hemorrhage.  Mild splenomegaly    Incidental Findings:   none     Hospital Course:   Danny Nvoa is a 29 y.o. male patient who originally presented to the hospital on 5/22/2025 due to abdominal pain.  Patient found to have severe iron deficiency anemia and was treated with IV iron.  GI was consulted and patient had EGD and colonoscopy on 5/23 without any signs of bleeding.  Patient found to have hemorrhoids.  Discussed constipation and hemorrhoid regimen outpatient.  Should continue with high-fiber diet and follow-up with GI outpatient.  Patient did require 3 units PRBC.  Since then, hemoglobin has remained stable and improved.    Please see above list of diagnoses and related plan for additional information.     Discharge Day Visit / Exam:   Subjective:  patient states that he is feeling much better since having blood transfusions. Denies chest pain, shortness of breath or abdominal pain. Agreeable to discharge plan and outpatient follow up.  Vitals: Blood Pressure: 127/60 (05/23/25 1320)  Pulse: 67 (05/23/25 1320)  Temperature: 98 °F (36.7 °C) (05/23/25 1320)  Temp Source: Temporal (05/23/25 1131)  Respirations: 21 (05/23/25 1320)  Height: 5' 9\" (175.3 cm) (05/22/25 1311)  Weight - Scale: 65.8 kg (145 lb) (05/22/25 1311)  SpO2: 98 % (05/23/25 1320)  Physical Exam  Vitals reviewed.   Constitutional:       General: He is not in acute distress.     Appearance: Normal appearance. He is obese. He is not ill-appearing.   HENT:      Head: Normocephalic and atraumatic.      Nose: Nose normal.      Mouth/Throat:      Mouth: Mucous membranes are moist.      Pharynx: Oropharynx is clear.     Eyes:      Extraocular Movements: Extraocular movements intact.      Conjunctiva/sclera: Conjunctivae " normal.       Cardiovascular:      Rate and Rhythm: Normal rate and regular rhythm.      Pulses: Normal pulses.      Heart sounds: Normal heart sounds. No murmur heard.  Pulmonary:      Effort: Pulmonary effort is normal. No respiratory distress.      Breath sounds: Normal breath sounds. No wheezing.   Abdominal:      General: Abdomen is flat. Bowel sounds are normal. There is no distension.      Palpations: Abdomen is soft.      Tenderness: There is no abdominal tenderness. There is no guarding.     Musculoskeletal:         General: Normal range of motion.      Cervical back: Normal range of motion.      Right lower leg: No edema.      Left lower leg: No edema.     Skin:     General: Skin is warm.      Coloration: Skin is pale.     Neurological:      General: No focal deficit present.      Mental Status: He is alert and oriented to person, place, and time. Mental status is at baseline.      Motor: No weakness.     Psychiatric:         Mood and Affect: Mood normal.         Behavior: Behavior normal.         Thought Content: Thought content normal.         Judgment: Judgment normal.          Discussion with Family: Patient declined call to .     Discharge instructions/Information to patient and family:   See after visit summary for information provided to patient and family.      Provisions for Follow-Up Care:  See after visit summary for information related to follow-up care and any pertinent home health orders.      Mobility at time of Discharge:   Basic Mobility Inpatient Raw Score: 24  JH-HLM Goal: 8: Walk 250 feet or more  JH-HLM Achieved: 8: Walk 250 feet ot more  HLM Goal achieved. Continue to encourage appropriate mobility.     Disposition:   Home    Planned Readmission: none    Administrative Statements   Discharge Statement:  I have spent a total time of 45 minutes in caring for this patient on the day of the visit/encounter. .    **Please Note: This note may have been constructed using a  voice recognition system**

## 2025-05-23 NOTE — CASE MANAGEMENT
Case Management Assessment & Discharge Planning Note    Patient name Danny Nova  Location /-01 MRN [id number]  : 1995 Date 2025       Current Admission Date: 2025  Current Admission Diagnosis:Symptomatic anemia   Patient Active Problem List    Diagnosis Date Noted    Symptomatic anemia 2025    Chronic constipation 2025      LOS (days): 1  Geometric Mean LOS (GMLOS) (days): 2.8  Days to GMLOS:2.1     OBJECTIVE:    Risk of Unplanned Readmission Score: 7.17         Current admission status: Inpatient  Referral Reason: Information    Preferred Pharmacy:   [id number] - [place], [place] - [address] [facility]  [address] [facility] [address]  Phone: [phone] Fax: [phone]    [address]  [address] [facility] [address]  Phone: [phone] Fax: [phone]    Primary Care Provider: No primary care provider on file.    Primary Insurance:   Secondary Insurance:     ASSESSMENT:  Active Health Care Proxies    There are no active Health Care Proxies on file.       Advance Directives  Does patient have a Health Care POA?: No  Was patient offered paperwork?: Yes  Does patient currently have a Health Care decision maker?: Yes, please see Health Care Proxy section  Does patient have Advance Directives?: No  Was patient offered paperwork?: Yes  Primary Contact: Spouse - Shannen         Readmission [facility]  30 Day Readmission: No    Patient Information  Admitted from:: Home  Mental Status: Alert  During Assessment patient was accompanied by: Not accompanied during assessment  Assessment information provided by:: Patient  Primary Caregiver: Self  Support Systems: Spouse/significant other, Friend, Friends/neighbors  County [facility] Residence: [facility]  What [facility] do you live in?: [place]  Home entry access options. Select all that apply.: Stairs  Number of steps to enter home.: 1  Do the steps have railings?:  No  Type of Current Residence: 3 story home  Upon entering residence, is there a bedroom on the main floor (no further steps)?: No  A bedroom is located on the following floor levels of residence (select all that apply):: 3rd Floor  Upon entering residence, is there a bathroom on the main floor (no further steps)?: Yes (and 2nd floor)  Number of steps to 3rd floor from main floor: Two Flights  Living Arrangements: Lives w/ Spouse/significant other, Lives w/ Friend  Is patient a ?: No    Activities of Daily Living Prior to Admission  Functional Status: Independent  Completes ADLs independently?: Yes  Ambulates independently?: Yes  Does patient use assisted devices?: No  Does patient currently own DME?: No  Does patient have a history of Outpatient Therapy (PT/OT)?: No  Does the patient have a history of Short-Term Rehab?: No  Does patient have a history of HHC?: No  Does patient currently have HHC?: No         Patient Information Continued  Income Source: Employed ([employer])  Does patient have prescription coverage?: No  Can the patient afford their medications and any related supplies (such as glucometers or test strips)?: N/A (patient has no medications at this time)  Does patient receive dialysis treatments?: No  Does patient have a history of substance abuse?: Yes, Currently using  Current substance use preference: Marijuana  Historical substance use preference: Marijuana, PCP, Alcohol/ETOH  History of Withdrawal Symptoms: Other withdrawal symptoms (specify in comment) (nausea, headaches, bowel issues, incontenence, throwing up, agression/body movements)  Is patient currently in treatment for substance abuse?: N/A - sober (since 2018 - does continue to use marijuana)  Does patient have a history of Mental Health Diagnosis?: Yes (depression)  Is patient receiving treatment for mental health?: No. Patient declined treatment information.  Has patient received inpatient treatment related to mental health in  the last 2 years?: No (IP admissions as youth)         Means of Transportation  Means of Transport to Appts:: Family transport          DISCHARGE DETAILS:    Discharge planning discussed with:: patient  Freedom of Choice: Yes  Comments - Freedom of Choice: No needs listed  CM contacted family/caregiver?: Yes  Were Treatment Team discharge recommendations reviewed with patient/caregiver?: Yes  Did patient/caregiver verbalize understanding of patient care needs?: Yes  Were patient/caregiver advised of the risks associated with not following Treatment Team discharge recommendations?: Yes    Contacts  Patient Contacts: Shannen (Spouse)  Relationship to Patient:: Family  Contact Method: Phone  Phone Number: [phone]  Reason/Outcome: Continuity of Care, Discharge Planning    Requested Home Health Care         Is the patient interested in HHC at discharge?: No    DME Referral Provided  Referral made for DME?: No    Other Referral/Resources/Interventions Provided:  Interventions: Other (Specify)  Referral Comments: food insecurity information    Would you like to participate in our [facility] service program?  : No - Declined    Treatment Team Recommendation: Home  Discharge Destination Plan:: Home  Transport at Discharge : Family          CM met with patient at the bedside,baseline information  was obtained. CM discussed the role of CM in helping the patient develop a discharge plan and assist the patient in carry out their plan.  Patient was IPTA, uses no DME at baseline for ambulation, and has no hx of rehab/therapy.   Patient lives in 3 story home - [address] - patient lives on 3rd floor (two flights of stairs to get there). Patient lives with spouse, nephew and two roommates. One roommate is Alessandra who is a support and assists patient with transportation.   Patient works FT at [employer]. Patient has no insurance at this time but wife received new employment and plans to have patient set up on their insurance  within the next month. Patient agreeable to CM reaching out to Financial Counselors to assist with PATHS application. Patient was educated on importance of completing paperwork and and discounts on hospital care if appropriate paperwork completed.   Patient admitted to D/A hx beginning when he was a rebellious teenager - participated in PCP, alcohol use and marijuana. Patient was able to acknowledge his problematic behaviors and utilize the criminal system to get I'm to a place where he can progress in life. Patient completed his skilled nursing time, went to  d/a in [place] and has been sober since 2018 other than marijuana use.   Patient reports hx of homelessness, manipulating friends to support his drug use and being aware from family due to his use as motivators to get sober. He did not like the way he was treating others.   Patient admitted to occasional depression - it comes and goes but he has a good support system and is easily redirected or uses positive self talk/re-framing situations.  CM discussed discharge planning - at this time patient has no anticipated CM needs.     CM placed email to financial counselors to apply to PATHS and requesting [employer].     CM provided patient resources on food insecurity per CM consult, emailed the same and added FindHelp information to AVS.    CM followed up and left message with updates.     CM to follow patient's care and discharge needs.

## 2025-05-23 NOTE — ANESTHESIA POSTPROCEDURE EVALUATION
Post-Op Assessment Note    CV Status:  Stable    Pain management: adequate       Mental Status:  Alert and awake   Hydration Status:  Euvolemic   PONV Controlled:  Controlled   Airway Patency:  Patent     Post Op Vitals Reviewed: Yes    No anethesia notable event occurred.    Staff: Anesthesiologist         Last Filed PACU Vitals:  Vitals Value Taken Time   Temp 98 °F (36.7 °C) 05/23/25 13:20   Pulse 67 05/23/25 13:20   /60 05/23/25 13:20   Resp 21 05/23/25 13:20   SpO2 98 % 05/23/25 13:20       Modified Esteban:     Vitals Value Taken Time   Activity 2 05/23/25 13:20   Respiration 2 05/23/25 13:20   Circulation 2 05/23/25 13:20   Consciousness 2 05/23/25 13:20   Oxygen Saturation 2 05/23/25 13:20     Modified Esteban Score: 10

## 2025-05-23 NOTE — ASSESSMENT & PLAN NOTE
POA with abdominal pain, lightheadedness and dyspnea on exertion. Was just seen by GI for his chronic constipation. Denies any recent hematochezia, hematemesis, hematuria or hemoptysis. Had some blood in BM a month ago but since he cut out caffeine, this has stopped  Hgb 4.4 on admission, 1 year ago hgb was 10.3   Ct high volume bleed scan without GI bleed   Given 3 units PRBC  EGD and colonoscopy 5/23 - no bleeding. Hemorrhoids noted, biopsies taken   GI consulted  Hgb stable   Iron panel with iron <10  Given  iv venofer  Transition to vit c and iron supplement on discharge   vitamin b12 and folate normal  Lead level pending with anemia    yes...

## 2025-05-23 NOTE — ASSESSMENT & PLAN NOTE
Last seen: 2/18/19  RTC: 1 month   Cancel: none  No-show: none  Next appt: none        Medication requested: Fluoxetine 40 mg  Directions: Take 1 tablet by mouth daily   Qty: 30  Last refilled: 9/5/18    Writer called and left messages for both patient and his mother as it shows last refill was in September.       Not on any maintenance therapy outpatient. Outpatient GI recommending diet changes  Last bm was day of admission but stated it was small and loose   Did receive lactulose during past ER visits for same  GI consulted   Discussed OTC bowel regimen on discharge    Sobbing in triage. States has depression and has been taking meds but it's getting worse. Reports having thoughts of dying but denies SI or plan to hurt herself. States family is preventing any suicidal actions. Tried to call PCP yesterday but was unable to get appointment until next week.

## 2025-05-23 NOTE — ASSESSMENT & PLAN NOTE
Addendum- pt also reports BRBPR with a recent colonoscopy. Given his significant anemia also recommend flex sig today. Possible colonoscopy outpatient depending on symptoms, findings, and labs during this admission

## 2025-05-23 NOTE — ANESTHESIA PREPROCEDURE EVALUATION
"Procedure:  EGD  FLEXIBLE SIGMOIDOSCOPY    Relevant Problems   ANESTHESIA (within normal limits)      CARDIO (within normal limits)      ENDO (within normal limits)      GI/HEPATIC   (+) BRBPR (bright red blood per rectum)      HEMATOLOGY   (+) Symptomatic anemia      Behavioral Health   (+) Cigarette smoker      FEN/Gastrointestinal   (+) Chronic constipation      Other   (+) History of nicotine vaping      CT High Volume Bleeding Scan 5/22/2025:  No CT evidence of active high-volume gastrointestinal hemorrhage.     Mild splenomegaly.    Lab Results   Component Value Date    WBC 5.84 05/23/2025    HGB 7.0 (L) 05/23/2025    HCT 25.8 (L) 05/23/2025    MCV 69 (L) 05/23/2025     05/23/2025     Lab Results   Component Value Date    SODIUM 136 05/23/2025    K 3.3 (L) 05/23/2025     05/23/2025    CO2 24 05/23/2025    BUN 9 05/23/2025    CREATININE 0.65 05/23/2025    GLUC 97 05/23/2025    CALCIUM 9.1 05/23/2025     Lab Results   Component Value Date    INR 1.07 05/22/2025    INR 1.12 04/30/2024    PROTIME 14.3 05/22/2025    PROTIME 14.3 04/30/2024     No results found for: \"HGBA1C\"       Physical Exam    Airway     Mallampati score: I  TM Distance: >3 FB  Neck ROM: full      Cardiovascular  Cardiovascular exam normal    Dental   No notable dental hx     Pulmonary  Pulmonary exam normal     Neurological  - normal exam    Other Findings        Anesthesia Plan  ASA Score- 3     Anesthesia Type- IV sedation with anesthesia with ASA Monitors.         Additional Monitors:     Airway Plan: natural airway.           Plan Factors-    Chart reviewed.   Existing labs reviewed. Patient summary reviewed.                  Induction- intravenous.    Postoperative Plan- .   Monitoring Plan - Monitoring plan - standard ASA monitoring      Perioperative Resuscitation Plan - Level 1 - Full Code.       Informed Consent- Anesthetic plan and risks discussed with patient.  I personally reviewed this patient with the CRNA. Discussed " and agreed on the Anesthesia Plan with the CRNA..      NPO Status:  No vitals data found for the desired time range.

## 2025-05-23 NOTE — PLAN OF CARE
Problem: PAIN - ADULT  Goal: Verbalizes/displays adequate comfort level or baseline comfort level  Description: Interventions:  - Encourage patient to monitor pain and request assistance  - Assess pain using appropriate pain scale  - Administer analgesics as ordered based on type and severity of pain and evaluate response  - Implement non-pharmacological measures as appropriate and evaluate response  - Consider cultural and social influences on pain and pain management  - Notify physician/advanced practitioner if interventions unsuccessful or patient reports new pain  - Educate patient/family on pain management process including their role and importance of  reporting pain   - Provide non-pharmacologic/complimentary pain relief interventions  5/23/2025 0508 by Michelle Monsalve RN  Outcome: Progressing  5/22/2025 2027 by Michelle Monsalve RN  Outcome: Progressing     Problem: INFECTION - ADULT  Goal: Absence or prevention of progression during hospitalization  Description: INTERVENTIONS:  - Assess and monitor for signs and symptoms of infection  - Monitor lab/diagnostic results  - Monitor all insertion sites, i.e. indwelling lines, tubes, and drains  - Monitor endotracheal if appropriate and nasal secretions for changes in amount and color  - [place] appropriate cooling/warming therapies per order  - Administer medications as ordered  - Instruct and encourage patient and family to use good hand hygiene technique  - Identify and instruct in appropriate isolation precautions for identified infection/condition  5/23/2025 0508 by Michelle Monsalve RN  Outcome: Progressing  5/22/2025 2027 by Michelle Monsalve RN  Outcome: Progressing  Goal: Absence of fever/infection during neutropenic period  Description: INTERVENTIONS:  - Monitor WBC  - Perform strict hand hygiene  - Limit to healthy visitors only  - No plants, dried, fresh or silk flowers with segura in patient room  5/23/2025 0508 by Michelle Monsalve RN  Outcome:  Progressing  5/22/2025 2027 by Michelle Monsalve RN  Outcome: Progressing     Problem: SAFETY ADULT  Goal: Patient will remain free of falls  Description: INTERVENTIONS:  - Educate patient/family on patient safety including physical limitations  - Instruct patient to call for assistance with activity   - Consider consulting OT/PT to assist with strengthening/mobility based on AM PAC & JH-HLM score  - Consult OT/PT to assist with strengthening/mobility   - Keep Call bell within reach  - Keep bed low and locked with side rails adjusted as appropriate  - Keep care items and personal belongings within reach  - Initiate and maintain comfort rounds  - Make Fall Risk Sign visible to staff  - Offer Toileting every - Hours, in advance of need  - Initiate/Maintain -alarm  - Obtain necessary fall risk management equipment: -  - Apply yellow socks and bracelet for high fall risk patients  - Consider moving patient to room near nurses station  5/23/2025 0508 by Michelle Monsalve RN  Outcome: Progressing  5/22/2025 2027 by Michelle Monsalve RN  Outcome: Progressing  Goal: Maintain or return to baseline ADL function  Description: INTERVENTIONS:  -  Assess patient's ability to carry out ADLs; assess patient's baseline for ADL function and identify physical deficits which impact ability to perform ADLs (bathing, care of mouth/teeth, toileting, grooming, dressing, etc.)  - Assess/evaluate cause of self-care deficits   - Assess range of motion  - Assess patient's mobility; develop plan if impaired  - Assess patient's need for assistive devices and provide as appropriate  - Encourage maximum independence but intervene and supervise when necessary  - Involve family in performance of ADLs  - Assess for home care needs following discharge   - Consider OT consult to assist with ADL evaluation and planning for discharge  - Provide patient education as appropriate  - Monitor functional capacity and physical performance, use of AM PAC & JH-HLM   -  Monitor gait, balance and fatigue with ambulation    5/23/2025 0508 by Michelle Monsalve RN  Outcome: Progressing  5/22/2025 2027 by Michelle Monsalve RN  Outcome: Progressing  Goal: Maintains/Returns to pre admission functional level  Description: INTERVENTIONS:  - Perform AM-PAC 6 Click Basic Mobility/ Daily Activity assessment daily.  - Set and communicate daily mobility goal to care team and patient/family/caregiver.   - Collaborate with rehabilitation services on mobility goals if consulted  - Perform Range of Motion - times a day.  - Reposition patient every - hours.  - Dangle patient - times a day  - Stand patient - times a day  - Ambulate patient - times a day  - Out of bed to chair - times a day   - Out of bed for meals - times a day  - Out of bed for toileting  - Record patient progress and toleration of activity level   5/23/2025 0508 by Michelle Monsalve RN  Outcome: Progressing  5/22/2025 2027 by Michelle Monsalve RN  Outcome: Progressing     Problem: DISCHARGE PLANNING  Goal: Discharge to home or other facility with appropriate resources  Description: INTERVENTIONS:  - Identify barriers to discharge w/patient and caregiver  - Arrange for needed discharge resources and transportation as appropriate  - Identify discharge learning needs (meds, wound care, etc.)  - Arrange for interpretive services to assist at discharge as needed  - Refer to Case Management Department for coordinating discharge planning if the patient needs post-hospital services based on physician/advanced practitioner order or complex needs related to functional status, cognitive ability, or social support system  5/23/2025 0508 by Michelle Monsalve RN  Outcome: Progressing  5/22/2025 2027 by Michelle Monsalve RN  Outcome: Progressing     Problem: Knowledge Deficit  Goal: Patient/family/caregiver demonstrates understanding of disease process, treatment plan, medications, and discharge instructions  Description: Complete learning assessment and  assess knowledge base.  Interventions:  - Provide teaching at level of understanding  - Provide teaching via preferred learning methods  5/23/2025 0508 by Michelle Monsalve RN  Outcome: Progressing  5/22/2025 2027 by Michelle Monsalve RN  Outcome: Progressing

## 2025-05-23 NOTE — CONSULTS
Consultation - Gastroenterology   Name: Danny Nova 29 y.o. male I MRN: [id number]  Unit/Bed#: -01 I Date of Admission: 5/22/2025   Date of Service: 5/23/2025 I Hospital Day: 1   Inpatient consult to gastroenterology  Consult performed by: Papa Dorman PA-C  Consult ordered by: Yasmine Durham PA-C        Physician Requesting Evaluation: Germaine Gupta MD   Reason for Evaluation / Principal Problem: Anemia    Assessment & Plan  Symptomatic anemia  Appears to have longstanding microcytic anemia with a long period of BRBPR which sounds like hemorrhoid/fissure bleeding likely related to constipation. He reports a recent colonoscopy in the last 4-6 months but does not recall the results or where it was done (sounds like [facility]).   -BID PPI  -EGD today to assess for UGI source of bleeding and celiac. Will also order celiac serology awhile  -possible colonoscopy early next week if needed vs outpatient if stable  -trend hgb, transfuse as needed. Pt on IV iron infustion  -further recommendations depending on EGD findings  Chronic constipation  Can start on a bowel regimen when eating after scope today if needed.   BRBPR (bright red blood per rectum)  Addendum- pt also reports BRBPR with a recent colonoscopy. Given his significant anemia also recommend flex sig today. Possible colonoscopy outpatient depending on symptoms, findings, and labs during this admission      History of Present Illness   HPI:  Danny Nova is a 29 y.o. male with a PMHx of anemia and constipation alternating with diarrhea presenting for severe anemia with hgb 4.4 on arrival. Chronically has hgb in the 10s follows with Dr Jones seen the day before his admission for constipation. He came in after having severe abdominal pain while trying to have a BM yesterday.    He denies recent bleeding with BMs but reports previously having bleeding with straining and reports a colonoscopy about 4-6 months ago (sounds like at [facility] based on his  description). Report not available at this time. Blood described as in the bowl and on the paper. For years, he was taking high doses of caffeine consuming numerous coffees and energy drinks daily- 2-3 energy drinks daily with several additional cups of coffee. Bleeding stopped when he cut out all caffeine about 6 weeks ago. Denies significant Nsaid use, does not take pre-workout or other similar supplements. No black BMs, hematemesis, coffee ground emesis. Denies heartburn, dysphagia. Had one day of vomiting when straining to have a BM several weeks ago and vomited 5 times that day but otherwise no vomiting.     No known hereditary anemias, no family or personal hx of celiac  Does not follow any restrictive diets. Eats meat frequently.  B12 and folate wnl    Endoscopic risk assessment:  Anticoagulation use:n  Diabetes medication:n  CV[facility] history:n  Abdominal surgeries:n  Sleep apnea:n  O2 use:n    Past Medical History[1]  Past Surgical History[2]  Social History   Social History     Substance and Sexual Activity   Alcohol Use Not Currently     Social History     Substance and Sexual Activity   Drug Use Yes    Types: Marijuana     Tobacco Use History[3]    Family History[4]      Medications Prior to Admission:     hydrocortisone 1 % ointment    Current Facility-Administered Medications:     iron sucrose (VENOFER) 200 mg in sodium chloride 0.9 % 100 mL IVPB, Daily    melatonin tablet 6 mg, HS    pantoprazole (PROTONIX) injection 40 mg, Q12H DARÍO    potassium chloride 20 mEq IVPB (premix), Once  Allergies[5]    Physical Exam  Constitutional:       General: He is not in acute distress.     Appearance: Normal appearance. He is not ill-appearing.   HENT:      Head: Normocephalic and atraumatic.     Eyes:      Conjunctiva/sclera: Conjunctivae normal.     Pulmonary:      Effort: Pulmonary effort is normal.   Abdominal:      General: Abdomen is flat. There is no distension.      Palpations: Abdomen is soft.      Tenderness:  There is no abdominal tenderness.     Skin:     General: Skin is warm and dry.      Coloration: Skin is not jaundiced.     Neurological:      Mental Status: He is alert and oriented to person, place, and time.     Psychiatric:         Mood and Affect: Mood normal.         Behavior: Behavior normal.       Most Recent Vital Signs:  Vitals:    05/22/25 2335 05/22/25 2350 05/23/25 0020 05/23/25 0140   BP: 123/60 128/67 116/63 108/55   BP Location:       Pulse: 73 66 70 62   Resp: 20 20 19 19   Temp: 97.5 °F (36.4 °C) 98.1 °F (36.7 °C) 98.2 °F (36.8 °C) 98.1 °F (36.7 °C)   TempSrc: Temporal Temporal Temporal Temporal   SpO2: 100% 99%  99%   Weight:       Height:           Intake/Output Summary (Last 24 hours) at 5/23/2025 0808  Last data filed at 5/23/2025 0140  Gross per 24 hour   Intake 1140 ml   Output --   Net 1140 ml       LABS/IMAGING  Lab Results: I have reviewed all relevant lab results during this hospitalization.    Imaging Studies:  I have reviewed all the relevant images during this hospitalizations    Counseling / Coordination of Care  Total time spent today 45 minutes. Greater than 50% of total time was spent with the patient and / or family counseling and / or coordination of care.    Papa Dorman PA-C         [1] No past medical history on file.  [2] No past surgical history on file.  [3]   Social History  Tobacco Use   Smoking Status Former    Types: Cigarettes   Smokeless Tobacco Never   [4] No family history on file.  [5] No Known Allergies

## 2025-05-23 NOTE — PLAN OF CARE
Problem: PAIN - ADULT  Goal: Verbalizes/displays adequate comfort level or baseline comfort level  Description: Interventions:  - Encourage patient to monitor pain and request assistance  - Assess pain using appropriate pain scale  - Administer analgesics as ordered based on type and severity of pain and evaluate response  - Implement non-pharmacological measures as appropriate and evaluate response  - Consider cultural and social influences on pain and pain management  - Notify physician/advanced practitioner if interventions unsuccessful or patient reports new pain  - Educate patient/family on pain management process including their role and importance of  reporting pain   - Provide non-pharmacologic/complimentary pain relief interventions  Outcome: Progressing     Problem: INFECTION - ADULT  Goal: Absence or prevention of progression during hospitalization  Description: INTERVENTIONS:  - Assess and monitor for signs and symptoms of infection  - Monitor lab/diagnostic results  - Monitor all insertion sites, i.e. indwelling lines, tubes, and drains  - Monitor endotracheal if appropriate and nasal secretions for changes in amount and color  - [place] appropriate cooling/warming therapies per order  - Administer medications as ordered  - Instruct and encourage patient and family to use good hand hygiene technique  - Identify and instruct in appropriate isolation precautions for identified infection/condition  Outcome: Progressing  Goal: Absence of fever/infection during neutropenic period  Description: INTERVENTIONS:  - Monitor WBC  - Perform strict hand hygiene  - Limit to healthy visitors only  - No plants, dried, fresh or silk flowers with segura in patient room  Outcome: Progressing     Problem: SAFETY ADULT  Goal: Patient will remain free of falls  Description: INTERVENTIONS:  - Educate patient/family on patient safety including physical limitations  - Instruct patient to call for assistance with activity   -  Consider consulting OT/PT to assist with strengthening/mobility based on AM PAC & JH-HLM score  - Consult OT/PT to assist with strengthening/mobility   - Keep Call bell within reach  - Keep bed low and locked with side rails adjusted as appropriate  - Keep care items and personal belongings within reach  - Initiate and maintain comfort rounds  - Make Fall Risk Sign visible to staff  - Offer Toileting every - Hours, in advance of need  - Initiate/Maintain -alarm  - Obtain necessary fall risk management equipment: -  - Apply yellow socks and bracelet for high fall risk patients  - Consider moving patient to room near nurses station  Outcome: Progressing  Goal: Maintain or return to baseline ADL function  Description: INTERVENTIONS:  -  Assess patient's ability to carry out ADLs; assess patient's baseline for ADL function and identify physical deficits which impact ability to perform ADLs (bathing, care of mouth/teeth, toileting, grooming, dressing, etc.)  - Assess/evaluate cause of self-care deficits   - Assess range of motion  - Assess patient's mobility; develop plan if impaired  - Assess patient's need for assistive devices and provide as appropriate  - Encourage maximum independence but intervene and supervise when necessary  - Involve family in performance of ADLs  - Assess for home care needs following discharge   - Consider OT consult to assist with ADL evaluation and planning for discharge  - Provide patient education as appropriate  - Monitor functional capacity and physical performance, use of AM PAC & JH-HLM   - Monitor gait, balance and fatigue with ambulation    Outcome: Progressing  Goal: Maintains/Returns to pre admission functional level  Description: INTERVENTIONS:  - Perform AM-PAC 6 Click Basic Mobility/ Daily Activity assessment daily.  - Set and communicate daily mobility goal to care team and patient/family/caregiver.   - Collaborate with rehabilitation services on mobility goals if consulted  -  Perform Range of Motion - times a day.  - Reposition patient every - hours.  - Dangle patient - times a day  - Stand patient - times a day  - Ambulate patient - times a day  - Out of bed to chair - times a day   - Out of bed for meals - times a day  - Out of bed for toileting  - Record patient progress and toleration of activity level   Outcome: Progressing     Problem: DISCHARGE PLANNING  Goal: Discharge to home or other facility with appropriate resources  Description: INTERVENTIONS:  - Identify barriers to discharge w/patient and caregiver  - Arrange for needed discharge resources and transportation as appropriate  - Identify discharge learning needs (meds, wound care, etc.)  - Arrange for interpretive services to assist at discharge as needed  - Refer to Case Management Department for coordinating discharge planning if the patient needs post-hospital services based on physician/advanced practitioner order or complex needs related to functional status, cognitive ability, or social support system  Outcome: Progressing     Problem: Knowledge Deficit  Goal: Patient/family/caregiver demonstrates understanding of disease process, treatment plan, medications, and discharge instructions  Description: Complete learning assessment and assess knowledge base.  Interventions:  - Provide teaching at level of understanding  - Provide teaching via preferred learning methods  Outcome: Progressing

## 2025-05-23 NOTE — NURSING NOTE
AVS provided to patient and patient verbalized understanding. Note for return to work given to patient. Vape returned to patient.

## 2025-05-23 NOTE — PLAN OF CARE
Problem: PAIN - ADULT  Goal: Verbalizes/displays adequate comfort level or baseline comfort level  Description: Interventions:  - Encourage patient to monitor pain and request assistance  - Assess pain using appropriate pain scale  - Administer analgesics as ordered based on type and severity of pain and evaluate response  - Implement non-pharmacological measures as appropriate and evaluate response  - Consider cultural and social influences on pain and pain management  - Notify physician/advanced practitioner if interventions unsuccessful or patient reports new pain  - Educate patient/family on pain management process including their role and importance of  reporting pain   - Provide non-pharmacologic/complimentary pain relief interventions  Outcome: Adequate for Discharge     Problem: INFECTION - ADULT  Goal: Absence or prevention of progression during hospitalization  Description: INTERVENTIONS:  - Assess and monitor for signs and symptoms of infection  - Monitor lab/diagnostic results  - Monitor all insertion sites, i.e. indwelling lines, tubes, and drains  - Monitor endotracheal if appropriate and nasal secretions for changes in amount and color  - [address] appropriate cooling/warming therapies per order  - Administer medications as ordered  - Instruct and encourage patient and family to use good hand hygiene technique  - Identify and instruct in appropriate isolation precautions for identified infection/condition  Outcome: Adequate for Discharge  Goal: Absence of fever/infection during neutropenic period  Description: INTERVENTIONS:  - Monitor WBC  - Perform strict hand hygiene  - Limit to healthy visitors only  - No plants, dried, fresh or silk flowers with segura in patient room  Outcome: Adequate for Discharge     Problem: SAFETY ADULT  Goal: Patient will remain free of falls  Description: INTERVENTIONS:  - Educate patient/family on patient safety including physical limitations  - Instruct patient to call  for assistance with activity   - Consider consulting OT/PT to assist with strengthening/mobility based on AM PAC & JH-HLM score  - Consult OT/PT to assist with strengthening/mobility   - Keep Call bell within reach  - Keep bed low and locked with side rails adjusted as appropriate  - Keep care items and personal belongings within reach    - Apply yellow socks and bracelet for high fall risk patients  - Consider moving patient to room near nurses station  Outcome: Adequate for Discharge  Goal: Maintain or return to baseline ADL function  Description: INTERVENTIONS:  -  Assess patient's ability to carry out ADLs; assess patient's baseline for ADL function and identify physical deficits which impact ability to perform ADLs (bathing, care of mouth/teeth, toileting, grooming, dressing, etc.)  - Assess/evaluate cause of self-care deficits   - Assess range of motion  - Assess patient's mobility; develop plan if impaired  - Assess patient's need for assistive devices and provide as appropriate  - Encourage maximum independence but intervene and supervise when necessary  - Involve family in performance of ADLs  - Assess for home care needs following discharge   - Consider OT consult to assist with ADL evaluation and planning for discharge  - Provide patient education as appropriate  - Monitor functional capacity and physical performance, use of AM PAC & JH-HLM   - Monitor gait, balance and fatigue with ambulation    Outcome: Adequate for Discharge  Goal: Maintains/Returns to pre admission functional level  Description: INTERVENTIONS:  - Perform AM-PAC 6 Click Basic Mobility/ Daily Activity assessment daily.  - Set and communicate daily mobility goal to care team and patient/family/caregiver.       - Out of bed for toileting  - Record patient progress and toleration of activity level   Outcome: Adequate for Discharge     Problem: DISCHARGE PLANNING  Goal: Discharge to home or other facility with appropriate  resources  Description: INTERVENTIONS:  - Identify barriers to discharge w/patient and caregiver  - Arrange for needed discharge resources and transportation as appropriate  - Identify discharge learning needs (meds, wound care, etc.)  - Arrange for interpretive services to assist at discharge as needed  - Refer to Case Management Department for coordinating discharge planning if the patient needs post-hospital services based on physician/advanced practitioner order or complex needs related to functional status, cognitive ability, or social support system  Outcome: Adequate for Discharge     Problem: Knowledge Deficit  Goal: Patient/family/caregiver demonstrates understanding of disease process, treatment plan, medications, and discharge instructions  Description: Complete learning assessment and assess knowledge base.  Interventions:  - Provide teaching at level of understanding  - Provide teaching via preferred learning methods  Outcome: Adequate for Discharge

## 2025-05-24 LAB — LEAD BLD-MCNC: <1 UG/DL (ref 0–3.4)

## 2025-05-27 LAB — TTG IGA SER IA-ACNC: 0.6 U/ML (ref ?–10)

## 2025-05-30 PROCEDURE — 88305 TISSUE EXAM BY PATHOLOGIST: CPT | Performed by: STUDENT IN AN ORGANIZED HEALTH CARE EDUCATION/TRAINING PROGRAM

## 2025-05-30 PROCEDURE — 88342 IMHCHEM/IMCYTCHM 1ST ANTB: CPT | Performed by: STUDENT IN AN ORGANIZED HEALTH CARE EDUCATION/TRAINING PROGRAM
